# Patient Record
Sex: FEMALE
[De-identification: names, ages, dates, MRNs, and addresses within clinical notes are randomized per-mention and may not be internally consistent; named-entity substitution may affect disease eponyms.]

---

## 2017-02-07 ENCOUNTER — RX RENEWAL (OUTPATIENT)
Age: 57
End: 2017-02-07

## 2017-02-08 ENCOUNTER — APPOINTMENT (OUTPATIENT)
Dept: RADIOLOGY | Facility: HOSPITAL | Age: 57
End: 2017-02-08

## 2017-02-08 ENCOUNTER — OUTPATIENT (OUTPATIENT)
Dept: OUTPATIENT SERVICES | Facility: HOSPITAL | Age: 57
LOS: 1 days | End: 2017-02-08
Payer: MEDICAID

## 2017-02-08 PROCEDURE — 73630 X-RAY EXAM OF FOOT: CPT

## 2017-02-16 ENCOUNTER — RX RENEWAL (OUTPATIENT)
Age: 57
End: 2017-02-16

## 2017-02-27 ENCOUNTER — RX RENEWAL (OUTPATIENT)
Age: 57
End: 2017-02-27

## 2017-02-28 ENCOUNTER — RX RENEWAL (OUTPATIENT)
Age: 57
End: 2017-02-28

## 2017-03-03 ENCOUNTER — APPOINTMENT (OUTPATIENT)
Dept: FAMILY MEDICINE | Facility: CLINIC | Age: 57
End: 2017-03-03

## 2017-03-03 VITALS
WEIGHT: 236 LBS | DIASTOLIC BLOOD PRESSURE: 70 MMHG | HEIGHT: 65 IN | RESPIRATION RATE: 20 BRPM | BODY MASS INDEX: 39.32 KG/M2 | HEART RATE: 68 BPM | SYSTOLIC BLOOD PRESSURE: 122 MMHG

## 2017-03-03 DIAGNOSIS — R22.1 LOCALIZED SWELLING, MASS AND LUMP, NECK: ICD-10-CM

## 2017-03-06 LAB
ALBUMIN SERPL ELPH-MCNC: 4.6 G/DL
ALP BLD-CCNC: 91 U/L
ALT SERPL-CCNC: 19 U/L
ANION GAP SERPL CALC-SCNC: 18 MMOL/L
AST SERPL-CCNC: 22 U/L
BASOPHILS # BLD AUTO: 0.03 K/UL
BASOPHILS NFR BLD AUTO: 0.3 %
BILIRUB SERPL-MCNC: 1 MG/DL
BUN SERPL-MCNC: 18 MG/DL
CALCIUM SERPL-MCNC: 9.7 MG/DL
CHLORIDE SERPL-SCNC: 102 MMOL/L
CHOLEST SERPL-MCNC: 185 MG/DL
CHOLEST/HDLC SERPL: 2.8 RATIO
CO2 SERPL-SCNC: 21 MMOL/L
CREAT SERPL-MCNC: 0.69 MG/DL
EOSINOPHIL # BLD AUTO: 0.22 K/UL
EOSINOPHIL NFR BLD AUTO: 2.2 %
GLUCOSE SERPL-MCNC: 105 MG/DL
HBA1C MFR BLD HPLC: 6.1 %
HCT VFR BLD CALC: 43.3 %
HDLC SERPL-MCNC: 65 MG/DL
HGB BLD-MCNC: 14 G/DL
IMM GRANULOCYTES NFR BLD AUTO: 0.3 %
LDLC SERPL CALC-MCNC: 102 MG/DL
LYMPHOCYTES # BLD AUTO: 1.84 K/UL
LYMPHOCYTES NFR BLD AUTO: 18.6 %
MAN DIFF?: NORMAL
MCHC RBC-ENTMCNC: 28.1 PG
MCHC RBC-ENTMCNC: 32.3 GM/DL
MCV RBC AUTO: 86.9 FL
MONOCYTES # BLD AUTO: 0.38 K/UL
MONOCYTES NFR BLD AUTO: 3.8 %
NEUTROPHILS # BLD AUTO: 7.38 K/UL
NEUTROPHILS NFR BLD AUTO: 74.8 %
PLATELET # BLD AUTO: 259 K/UL
POTASSIUM SERPL-SCNC: 4.7 MMOL/L
PROT SERPL-MCNC: 8 G/DL
RBC # BLD: 4.98 M/UL
RBC # FLD: 14.7 %
SODIUM SERPL-SCNC: 141 MMOL/L
T4 FREE SERPL-MCNC: 1.2 NG/DL
TRIGL SERPL-MCNC: 89 MG/DL
TSH SERPL-ACNC: 0.55 UIU/ML
WBC # FLD AUTO: 9.88 K/UL

## 2017-03-13 ENCOUNTER — RX RENEWAL (OUTPATIENT)
Age: 57
End: 2017-03-13

## 2017-04-17 ENCOUNTER — RX RENEWAL (OUTPATIENT)
Age: 57
End: 2017-04-17

## 2017-05-11 ENCOUNTER — RX RENEWAL (OUTPATIENT)
Age: 57
End: 2017-05-11

## 2017-05-16 ENCOUNTER — CLINICAL ADVICE (OUTPATIENT)
Age: 57
End: 2017-05-16

## 2017-06-02 ENCOUNTER — APPOINTMENT (OUTPATIENT)
Dept: FAMILY MEDICINE | Facility: CLINIC | Age: 57
End: 2017-06-02

## 2017-06-13 ENCOUNTER — RX RENEWAL (OUTPATIENT)
Age: 57
End: 2017-06-13

## 2017-06-29 ENCOUNTER — RX RENEWAL (OUTPATIENT)
Age: 57
End: 2017-06-29

## 2017-07-03 ENCOUNTER — RX RENEWAL (OUTPATIENT)
Age: 57
End: 2017-07-03

## 2017-07-13 ENCOUNTER — RX RENEWAL (OUTPATIENT)
Age: 57
End: 2017-07-13

## 2017-07-30 ENCOUNTER — RX RENEWAL (OUTPATIENT)
Age: 57
End: 2017-07-30

## 2017-08-04 ENCOUNTER — RX RENEWAL (OUTPATIENT)
Age: 57
End: 2017-08-04

## 2017-08-11 ENCOUNTER — RX RENEWAL (OUTPATIENT)
Age: 57
End: 2017-08-11

## 2017-09-11 ENCOUNTER — RX RENEWAL (OUTPATIENT)
Age: 57
End: 2017-09-11

## 2017-09-28 ENCOUNTER — RX RENEWAL (OUTPATIENT)
Age: 57
End: 2017-09-28

## 2017-10-12 ENCOUNTER — RX RENEWAL (OUTPATIENT)
Age: 57
End: 2017-10-12

## 2017-11-10 ENCOUNTER — RX RENEWAL (OUTPATIENT)
Age: 57
End: 2017-11-10

## 2017-11-27 ENCOUNTER — RX RENEWAL (OUTPATIENT)
Age: 57
End: 2017-11-27

## 2017-11-28 ENCOUNTER — APPOINTMENT (OUTPATIENT)
Dept: FAMILY MEDICINE | Facility: CLINIC | Age: 57
End: 2017-11-28
Payer: MEDICAID

## 2017-11-28 VITALS
HEART RATE: 74 BPM | HEIGHT: 65 IN | SYSTOLIC BLOOD PRESSURE: 125 MMHG | DIASTOLIC BLOOD PRESSURE: 70 MMHG | BODY MASS INDEX: 39.32 KG/M2 | WEIGHT: 236 LBS | RESPIRATION RATE: 20 BRPM

## 2017-11-28 PROCEDURE — G0008: CPT

## 2017-11-28 PROCEDURE — 90688 IIV4 VACCINE SPLT 0.5 ML IM: CPT

## 2017-11-28 PROCEDURE — 36415 COLL VENOUS BLD VENIPUNCTURE: CPT

## 2017-11-28 PROCEDURE — 99213 OFFICE O/P EST LOW 20 MIN: CPT | Mod: 25

## 2017-12-09 LAB
ALBUMIN SERPL ELPH-MCNC: 4.4 G/DL
ALP BLD-CCNC: 97 U/L
ALT SERPL-CCNC: 10 U/L
ANION GAP SERPL CALC-SCNC: 18 MMOL/L
AST SERPL-CCNC: 15 U/L
BASOPHILS # BLD AUTO: 0.05 K/UL
BASOPHILS NFR BLD AUTO: 0.5 %
BILIRUB SERPL-MCNC: 1.2 MG/DL
BUN SERPL-MCNC: 16 MG/DL
CALCIUM SERPL-MCNC: 9.9 MG/DL
CHLORIDE SERPL-SCNC: 102 MMOL/L
CHOLEST SERPL-MCNC: 189 MG/DL
CHOLEST/HDLC SERPL: 3.3 RATIO
CO2 SERPL-SCNC: 21 MMOL/L
CREAT SERPL-MCNC: 0.89 MG/DL
EOSINOPHIL # BLD AUTO: 0.17 K/UL
EOSINOPHIL NFR BLD AUTO: 1.8 %
GLUCOSE SERPL-MCNC: 113 MG/DL
HBA1C MFR BLD HPLC: 5.9 %
HCT VFR BLD CALC: 42.1 %
HCV AB SER QL: NONREACTIVE
HCV S/CO RATIO: 0.18 S/CO
HDLC SERPL-MCNC: 57 MG/DL
HGB BLD-MCNC: 13.8 G/DL
IMM GRANULOCYTES NFR BLD AUTO: 0.1 %
LDLC SERPL CALC-MCNC: 106 MG/DL
LYMPHOCYTES # BLD AUTO: 1.14 K/UL
LYMPHOCYTES NFR BLD AUTO: 12.2 %
MAN DIFF?: NORMAL
MCHC RBC-ENTMCNC: 28.1 PG
MCHC RBC-ENTMCNC: 32.8 GM/DL
MCV RBC AUTO: 85.7 FL
MONOCYTES # BLD AUTO: 0.4 K/UL
MONOCYTES NFR BLD AUTO: 4.3 %
NEUTROPHILS # BLD AUTO: 7.58 K/UL
NEUTROPHILS NFR BLD AUTO: 81.1 %
PLATELET # BLD AUTO: 289 K/UL
POTASSIUM SERPL-SCNC: 5 MMOL/L
PROT SERPL-MCNC: 8.2 G/DL
RBC # BLD: 4.91 M/UL
RBC # FLD: 13.9 %
SODIUM SERPL-SCNC: 141 MMOL/L
T4 FREE SERPL-MCNC: 1.3 NG/DL
TRIGL SERPL-MCNC: 129 MG/DL
TSH SERPL-ACNC: 0.65 UIU/ML
WBC # FLD AUTO: 9.35 K/UL

## 2017-12-11 ENCOUNTER — RX RENEWAL (OUTPATIENT)
Age: 57
End: 2017-12-11

## 2017-12-28 ENCOUNTER — RX RENEWAL (OUTPATIENT)
Age: 57
End: 2017-12-28

## 2018-01-09 ENCOUNTER — RX RENEWAL (OUTPATIENT)
Age: 58
End: 2018-01-09

## 2018-01-22 ENCOUNTER — RX RENEWAL (OUTPATIENT)
Age: 58
End: 2018-01-22

## 2018-02-08 ENCOUNTER — RX RENEWAL (OUTPATIENT)
Age: 58
End: 2018-02-08

## 2018-02-11 ENCOUNTER — RX RENEWAL (OUTPATIENT)
Age: 58
End: 2018-02-11

## 2018-03-06 ENCOUNTER — RX RENEWAL (OUTPATIENT)
Age: 58
End: 2018-03-06

## 2018-03-12 ENCOUNTER — RX RENEWAL (OUTPATIENT)
Age: 58
End: 2018-03-12

## 2018-03-22 ENCOUNTER — RX RENEWAL (OUTPATIENT)
Age: 58
End: 2018-03-22

## 2018-04-06 ENCOUNTER — RX RENEWAL (OUTPATIENT)
Age: 58
End: 2018-04-06

## 2018-04-23 ENCOUNTER — APPOINTMENT (OUTPATIENT)
Dept: FAMILY MEDICINE | Facility: CLINIC | Age: 58
End: 2018-04-23

## 2018-04-25 ENCOUNTER — RX RENEWAL (OUTPATIENT)
Age: 58
End: 2018-04-25

## 2018-05-04 ENCOUNTER — RX RENEWAL (OUTPATIENT)
Age: 58
End: 2018-05-04

## 2018-05-12 ENCOUNTER — CLINICAL ADVICE (OUTPATIENT)
Age: 58
End: 2018-05-12

## 2018-06-01 ENCOUNTER — RX RENEWAL (OUTPATIENT)
Age: 58
End: 2018-06-01

## 2018-06-07 ENCOUNTER — RX RENEWAL (OUTPATIENT)
Age: 58
End: 2018-06-07

## 2018-07-02 ENCOUNTER — RX RENEWAL (OUTPATIENT)
Age: 58
End: 2018-07-02

## 2018-08-02 ENCOUNTER — RX RENEWAL (OUTPATIENT)
Age: 58
End: 2018-08-02

## 2018-08-30 ENCOUNTER — RX RENEWAL (OUTPATIENT)
Age: 58
End: 2018-08-30

## 2018-09-17 ENCOUNTER — RX RENEWAL (OUTPATIENT)
Age: 58
End: 2018-09-17

## 2018-09-24 ENCOUNTER — APPOINTMENT (OUTPATIENT)
Dept: FAMILY MEDICINE | Facility: CLINIC | Age: 58
End: 2018-09-24
Payer: COMMERCIAL

## 2018-09-24 VITALS
DIASTOLIC BLOOD PRESSURE: 70 MMHG | WEIGHT: 238 LBS | HEART RATE: 64 BPM | RESPIRATION RATE: 20 BRPM | SYSTOLIC BLOOD PRESSURE: 125 MMHG | BODY MASS INDEX: 39.65 KG/M2 | HEIGHT: 65 IN

## 2018-09-24 DIAGNOSIS — Z00.00 ENCOUNTER FOR GENERAL ADULT MEDICAL EXAMINATION W/OUT ABNORMAL FINDINGS: ICD-10-CM

## 2018-09-24 PROCEDURE — 90686 IIV4 VACC NO PRSV 0.5 ML IM: CPT

## 2018-09-24 PROCEDURE — G0008: CPT

## 2018-09-24 PROCEDURE — 99396 PREV VISIT EST AGE 40-64: CPT | Mod: 25

## 2018-09-24 PROCEDURE — 36415 COLL VENOUS BLD VENIPUNCTURE: CPT

## 2018-09-24 PROCEDURE — 93000 ELECTROCARDIOGRAM COMPLETE: CPT | Mod: 59

## 2018-09-24 NOTE — HEALTH RISK ASSESSMENT
[Discussed at today's visit] : Advance Directives Discussed at today's visit [] : No [FreeTextEntry4] : to check records

## 2018-09-24 NOTE — ASSESSMENT
[FreeTextEntry1] : Hemodynamically stable with acceptable BP; exam findings consistent with history\par All chronic issues well managed with current medication regime\par Lab profiles sent\par Flu vaccine given L deltoid

## 2018-09-24 NOTE — HISTORY OF PRESENT ILLNESS
[FreeTextEntry1] : Requests comprehensive exam [de-identified] : Presents for comprehensive exam.  Reviewed immunizations - requests flu vaccine.  Reviewed screening - has not had GYN exam or mammogram in an extended period of time - discussed the importance - pt is very hesitant due to home issues.  Also has not had colonoscopy but does agree to fecal immunology - kit given at this encounter.  Reviewed medication - using all medication very appropriately to maintain daily activities and responsibilities at home and general QOL - note pt is alert, conversant, with no evidence of intoxication or euphoria.

## 2018-09-24 NOTE — PHYSICAL EXAM
[No Acute Distress] : no acute distress [Well Nourished] : well nourished [Well Developed] : well developed [Well-Appearing] : well-appearing [Normal Sclera/Conjunctiva] : normal sclera/conjunctiva [PERRL] : pupils equal round and reactive to light [EOMI] : extraocular movements intact [Normal Outer Ear/Nose] : the outer ears and nose were normal in appearance [Normal Oropharynx] : the oropharynx was normal [Normal TMs] : both tympanic membranes were normal [Normal Nasal Mucosa] : the nasal mucosa was normal [No JVD] : no jugular venous distention [Supple] : supple [No Lymphadenopathy] : no lymphadenopathy [Thyroid Normal, No Nodules] : the thyroid was normal and there were no nodules present [No Respiratory Distress] : no respiratory distress  [Clear to Auscultation] : lungs were clear to auscultation bilaterally [No Accessory Muscle Use] : no accessory muscle use [Normal Rate] : normal rate  [Regular Rhythm] : with a regular rhythm [Normal S1, S2] : normal S1 and S2 [No Murmur] : no murmur heard [No Carotid Bruits] : no carotid bruits [No Abdominal Bruit] : a ~M bruit was not heard ~T in the abdomen [No Varicosities] : no varicosities [Pedal Pulses Present] : the pedal pulses are present [No Edema] : there was no peripheral edema [No Extremity Clubbing/Cyanosis] : no extremity clubbing/cyanosis [No Palpable Aorta] : no palpable aorta [Soft] : abdomen soft [Non Tender] : non-tender [Non-distended] : non-distended [No Masses] : no abdominal mass palpated [No HSM] : no HSM [Normal Bowel Sounds] : normal bowel sounds [Normal Posterior Cervical Nodes] : no posterior cervical lymphadenopathy [Normal Femoral Nodes] : no femoral lymphadenopathy [Normal Anterior Cervical Nodes] : no anterior cervical lymphadenopathy [Normal Inguinal Nodes] : no inguinal lymphadenopathy [L-Spine ___ (level)] : ~Ulevel [unfilled] lumbar spine [Muscle Spasms, Bilateral] : bilateral muscle spasms [Shuffling] : shuffling [Motor Strength Lower Extremities Bilaterally] : there was weakness in both lower extremities [No Rash] : no rash [No Skin Lesions] : no skin lesions [No Focal Deficits] : no focal deficits [Speech Grossly Normal] : speech grossly normal [Memory Grossly Normal] : memory grossly normal [Normal Affect] : the affect was normal [Alert and Oriented x3] : oriented to person, place, and time [Normal Mood] : the mood was normal [Normal Insight/Judgement] : insight and judgment were intact [de-identified] : using cane for ambulation

## 2018-09-25 LAB
ALBUMIN SERPL ELPH-MCNC: 4.7 G/DL
ALP BLD-CCNC: 108 U/L
ALT SERPL-CCNC: 13 U/L
ANION GAP SERPL CALC-SCNC: 21 MMOL/L
AST SERPL-CCNC: 18 U/L
BASOPHILS # BLD AUTO: 0.03 K/UL
BASOPHILS NFR BLD AUTO: 0.3 %
BILIRUB SERPL-MCNC: 1.1 MG/DL
BUN SERPL-MCNC: 16 MG/DL
CALCIUM SERPL-MCNC: 9.8 MG/DL
CHLORIDE SERPL-SCNC: 102 MMOL/L
CHOLEST SERPL-MCNC: 145 MG/DL
CHOLEST/HDLC SERPL: 2.6 RATIO
CO2 SERPL-SCNC: 20 MMOL/L
CREAT SERPL-MCNC: 1.01 MG/DL
EOSINOPHIL # BLD AUTO: 0.09 K/UL
EOSINOPHIL NFR BLD AUTO: 0.9 %
GLUCOSE SERPL-MCNC: 113 MG/DL
HBA1C MFR BLD HPLC: 6 %
HCT VFR BLD CALC: 44.6 %
HDLC SERPL-MCNC: 55 MG/DL
HGB BLD-MCNC: 13.7 G/DL
IMM GRANULOCYTES NFR BLD AUTO: 0.2 %
LDLC SERPL CALC-MCNC: 65 MG/DL
LYMPHOCYTES # BLD AUTO: 1.57 K/UL
LYMPHOCYTES NFR BLD AUTO: 16.3 %
MAN DIFF?: NORMAL
MCHC RBC-ENTMCNC: 26.1 PG
MCHC RBC-ENTMCNC: 30.7 GM/DL
MCV RBC AUTO: 85 FL
MONOCYTES # BLD AUTO: 0.44 K/UL
MONOCYTES NFR BLD AUTO: 4.6 %
NEUTROPHILS # BLD AUTO: 7.47 K/UL
NEUTROPHILS NFR BLD AUTO: 77.7 %
PLATELET # BLD AUTO: 317 K/UL
POTASSIUM SERPL-SCNC: 4.8 MMOL/L
PROT SERPL-MCNC: 8.1 G/DL
RBC # BLD: 5.25 M/UL
RBC # FLD: 15 %
SODIUM SERPL-SCNC: 143 MMOL/L
T4 FREE SERPL-MCNC: 1.4 NG/DL
TRIGL SERPL-MCNC: 125 MG/DL
TSH SERPL-ACNC: 0.33 UIU/ML
WBC # FLD AUTO: 9.62 K/UL

## 2018-09-27 ENCOUNTER — RX RENEWAL (OUTPATIENT)
Age: 58
End: 2018-09-27

## 2018-10-11 ENCOUNTER — RX RENEWAL (OUTPATIENT)
Age: 58
End: 2018-10-11

## 2018-10-18 ENCOUNTER — RX RENEWAL (OUTPATIENT)
Age: 58
End: 2018-10-18

## 2018-10-26 ENCOUNTER — RX RENEWAL (OUTPATIENT)
Age: 58
End: 2018-10-26

## 2018-11-19 ENCOUNTER — RX RENEWAL (OUTPATIENT)
Age: 58
End: 2018-11-19

## 2018-11-26 ENCOUNTER — RX RENEWAL (OUTPATIENT)
Age: 58
End: 2018-11-26

## 2018-11-28 DIAGNOSIS — B00.9 HERPESVIRAL INFECTION, UNSPECIFIED: ICD-10-CM

## 2018-12-20 ENCOUNTER — RX RENEWAL (OUTPATIENT)
Age: 58
End: 2018-12-20

## 2019-01-07 ENCOUNTER — RX RENEWAL (OUTPATIENT)
Age: 59
End: 2019-01-07

## 2019-01-11 ENCOUNTER — CLINICAL ADVICE (OUTPATIENT)
Age: 59
End: 2019-01-11

## 2019-01-24 ENCOUNTER — RX RENEWAL (OUTPATIENT)
Age: 59
End: 2019-01-24

## 2019-02-22 ENCOUNTER — RX RENEWAL (OUTPATIENT)
Age: 59
End: 2019-02-22

## 2019-03-25 ENCOUNTER — RX RENEWAL (OUTPATIENT)
Age: 59
End: 2019-03-25

## 2019-04-15 ENCOUNTER — RX RENEWAL (OUTPATIENT)
Age: 59
End: 2019-04-15

## 2019-04-23 ENCOUNTER — RX RENEWAL (OUTPATIENT)
Age: 59
End: 2019-04-23

## 2019-04-30 ENCOUNTER — RX RENEWAL (OUTPATIENT)
Age: 59
End: 2019-04-30

## 2019-05-21 ENCOUNTER — RX RENEWAL (OUTPATIENT)
Age: 59
End: 2019-05-21

## 2019-06-08 ENCOUNTER — RX RENEWAL (OUTPATIENT)
Age: 59
End: 2019-06-08

## 2019-06-18 ENCOUNTER — RX RENEWAL (OUTPATIENT)
Age: 59
End: 2019-06-18

## 2019-06-21 ENCOUNTER — RX RENEWAL (OUTPATIENT)
Age: 59
End: 2019-06-21

## 2019-07-05 ENCOUNTER — RX CHANGE (OUTPATIENT)
Age: 59
End: 2019-07-05

## 2019-07-08 RX ORDER — BENZETHONIUM CHLORIDE 0 ML/ML
0.1 SOLUTION TOPICAL
Qty: 8 | Refills: 5 | Status: COMPLETED | OUTPATIENT
Start: 2019-07-08 | End: 2019-07-08

## 2019-07-18 ENCOUNTER — RX RENEWAL (OUTPATIENT)
Age: 59
End: 2019-07-18

## 2019-07-22 ENCOUNTER — RX RENEWAL (OUTPATIENT)
Age: 59
End: 2019-07-22

## 2019-07-29 ENCOUNTER — RX RENEWAL (OUTPATIENT)
Age: 59
End: 2019-07-29

## 2019-08-16 ENCOUNTER — RX RENEWAL (OUTPATIENT)
Age: 59
End: 2019-08-16

## 2019-08-22 ENCOUNTER — RX RENEWAL (OUTPATIENT)
Age: 59
End: 2019-08-22

## 2019-08-29 ENCOUNTER — RX RENEWAL (OUTPATIENT)
Age: 59
End: 2019-08-29

## 2019-09-16 ENCOUNTER — RX RENEWAL (OUTPATIENT)
Age: 59
End: 2019-09-16

## 2019-10-15 ENCOUNTER — RX CHANGE (OUTPATIENT)
Age: 59
End: 2019-10-15

## 2019-11-15 ENCOUNTER — RX RENEWAL (OUTPATIENT)
Age: 59
End: 2019-11-15

## 2019-12-16 ENCOUNTER — RX RENEWAL (OUTPATIENT)
Age: 59
End: 2019-12-16

## 2020-02-13 ENCOUNTER — RX RENEWAL (OUTPATIENT)
Age: 60
End: 2020-02-13

## 2020-03-13 ENCOUNTER — APPOINTMENT (OUTPATIENT)
Dept: FAMILY MEDICINE | Facility: CLINIC | Age: 60
End: 2020-03-13

## 2020-06-04 ENCOUNTER — RX RENEWAL (OUTPATIENT)
Age: 60
End: 2020-06-04

## 2020-08-09 ENCOUNTER — RX RENEWAL (OUTPATIENT)
Age: 60
End: 2020-08-09

## 2020-08-23 ENCOUNTER — RX RENEWAL (OUTPATIENT)
Age: 60
End: 2020-08-23

## 2021-02-05 ENCOUNTER — RX RENEWAL (OUTPATIENT)
Age: 61
End: 2021-02-05

## 2021-04-25 ENCOUNTER — RX RENEWAL (OUTPATIENT)
Age: 61
End: 2021-04-25

## 2021-05-10 ENCOUNTER — RX RENEWAL (OUTPATIENT)
Age: 61
End: 2021-05-10

## 2021-06-24 ENCOUNTER — APPOINTMENT (OUTPATIENT)
Dept: FAMILY MEDICINE | Facility: CLINIC | Age: 61
End: 2021-06-24

## 2021-10-20 ENCOUNTER — RX RENEWAL (OUTPATIENT)
Age: 61
End: 2021-10-20

## 2021-11-01 ENCOUNTER — TRANSCRIPTION ENCOUNTER (OUTPATIENT)
Age: 61
End: 2021-11-01

## 2021-11-02 ENCOUNTER — NON-APPOINTMENT (OUTPATIENT)
Age: 61
End: 2021-11-02

## 2021-11-15 ENCOUNTER — RX RENEWAL (OUTPATIENT)
Age: 61
End: 2021-11-15

## 2022-01-18 ENCOUNTER — APPOINTMENT (OUTPATIENT)
Dept: FAMILY MEDICINE | Facility: CLINIC | Age: 62
End: 2022-01-18

## 2022-02-10 ENCOUNTER — RX RENEWAL (OUTPATIENT)
Age: 62
End: 2022-02-10

## 2022-04-25 ENCOUNTER — RX RENEWAL (OUTPATIENT)
Age: 62
End: 2022-04-25

## 2022-05-26 ENCOUNTER — RX RENEWAL (OUTPATIENT)
Age: 62
End: 2022-05-26

## 2022-08-13 ENCOUNTER — RX RENEWAL (OUTPATIENT)
Age: 62
End: 2022-08-13

## 2022-09-01 ENCOUNTER — APPOINTMENT (OUTPATIENT)
Dept: FAMILY MEDICINE | Facility: CLINIC | Age: 62
End: 2022-09-01

## 2022-10-10 ENCOUNTER — TRANSCRIPTION ENCOUNTER (OUTPATIENT)
Age: 62
End: 2022-10-10

## 2022-10-11 ENCOUNTER — APPOINTMENT (OUTPATIENT)
Dept: FAMILY MEDICINE | Facility: CLINIC | Age: 62
End: 2022-10-11

## 2022-10-15 ENCOUNTER — TRANSCRIPTION ENCOUNTER (OUTPATIENT)
Age: 62
End: 2022-10-15

## 2022-10-15 ENCOUNTER — EMERGENCY (EMERGENCY)
Facility: HOSPITAL | Age: 62
LOS: 1 days | Discharge: HOME | End: 2022-10-15
Attending: EMERGENCY MEDICINE
Payer: MEDICAID

## 2022-10-15 VITALS
OXYGEN SATURATION: 100 % | HEART RATE: 76 BPM | TEMPERATURE: 99 F | HEIGHT: 61 IN | WEIGHT: 149.91 LBS | SYSTOLIC BLOOD PRESSURE: 124 MMHG | DIASTOLIC BLOOD PRESSURE: 82 MMHG | RESPIRATION RATE: 20 BRPM

## 2022-10-15 LAB
ALBUMIN SERPL ELPH-MCNC: 4.5 G/DL — SIGNIFICANT CHANGE UP (ref 3.3–5)
ALP SERPL-CCNC: 84 U/L — SIGNIFICANT CHANGE UP (ref 40–120)
ALT FLD-CCNC: 9 U/L — LOW (ref 10–45)
AMPHET UR-MCNC: NEGATIVE — SIGNIFICANT CHANGE UP
ANION GAP SERPL CALC-SCNC: 18 MMOL/L — HIGH (ref 5–17)
APAP SERPL-MCNC: <15 UG/ML — SIGNIFICANT CHANGE UP (ref 10–30)
AST SERPL-CCNC: 11 U/L — SIGNIFICANT CHANGE UP (ref 10–40)
BARBITURATES UR SCN-MCNC: NEGATIVE — SIGNIFICANT CHANGE UP
BASOPHILS # BLD AUTO: 0.06 K/UL — SIGNIFICANT CHANGE UP (ref 0–0.2)
BASOPHILS NFR BLD AUTO: 0.7 % — SIGNIFICANT CHANGE UP (ref 0–2)
BENZODIAZ UR-MCNC: POSITIVE
BILIRUB SERPL-MCNC: 0.6 MG/DL — SIGNIFICANT CHANGE UP (ref 0.2–1.2)
BUN SERPL-MCNC: 27 MG/DL — HIGH (ref 7–23)
CALCIUM SERPL-MCNC: 9.6 MG/DL — SIGNIFICANT CHANGE UP (ref 8.4–10.5)
CHLORIDE SERPL-SCNC: 107 MMOL/L — SIGNIFICANT CHANGE UP (ref 96–108)
CO2 SERPL-SCNC: 16 MMOL/L — LOW (ref 22–31)
COCAINE METAB.OTHER UR-MCNC: NEGATIVE — SIGNIFICANT CHANGE UP
CREAT SERPL-MCNC: 0.93 MG/DL — SIGNIFICANT CHANGE UP (ref 0.5–1.3)
EGFR: 71 ML/MIN/1.73M2 — SIGNIFICANT CHANGE UP
EOSINOPHIL # BLD AUTO: 0.17 K/UL — SIGNIFICANT CHANGE UP (ref 0–0.5)
EOSINOPHIL NFR BLD AUTO: 2 % — SIGNIFICANT CHANGE UP (ref 0–6)
ETHANOL SERPL-MCNC: <10 MG/DL — SIGNIFICANT CHANGE UP (ref 0–10)
GLUCOSE SERPL-MCNC: 129 MG/DL — HIGH (ref 70–99)
HCT VFR BLD CALC: 38.8 % — SIGNIFICANT CHANGE UP (ref 34.5–45)
HGB BLD-MCNC: 12.2 G/DL — SIGNIFICANT CHANGE UP (ref 11.5–15.5)
IMM GRANULOCYTES NFR BLD AUTO: 0.2 % — SIGNIFICANT CHANGE UP (ref 0–0.9)
LYMPHOCYTES # BLD AUTO: 1.48 K/UL — SIGNIFICANT CHANGE UP (ref 1–3.3)
LYMPHOCYTES # BLD AUTO: 17.1 % — SIGNIFICANT CHANGE UP (ref 13–44)
MCHC RBC-ENTMCNC: 25.6 PG — LOW (ref 27–34)
MCHC RBC-ENTMCNC: 31.4 GM/DL — LOW (ref 32–36)
MCV RBC AUTO: 81.5 FL — SIGNIFICANT CHANGE UP (ref 80–100)
METHADONE UR-MCNC: NEGATIVE — SIGNIFICANT CHANGE UP
MONOCYTES # BLD AUTO: 0.59 K/UL — SIGNIFICANT CHANGE UP (ref 0–0.9)
MONOCYTES NFR BLD AUTO: 6.8 % — SIGNIFICANT CHANGE UP (ref 2–14)
NEUTROPHILS # BLD AUTO: 6.32 K/UL — SIGNIFICANT CHANGE UP (ref 1.8–7.4)
NEUTROPHILS NFR BLD AUTO: 73.2 % — SIGNIFICANT CHANGE UP (ref 43–77)
NRBC # BLD: 0 /100 WBCS — SIGNIFICANT CHANGE UP (ref 0–0)
OPIATES UR-MCNC: NEGATIVE — SIGNIFICANT CHANGE UP
OXYCODONE UR-MCNC: NEGATIVE — SIGNIFICANT CHANGE UP
PCP SPEC-MCNC: SIGNIFICANT CHANGE UP
PCP UR-MCNC: NEGATIVE — SIGNIFICANT CHANGE UP
PLATELET # BLD AUTO: 320 K/UL — SIGNIFICANT CHANGE UP (ref 150–400)
POTASSIUM SERPL-MCNC: 4.2 MMOL/L — SIGNIFICANT CHANGE UP (ref 3.5–5.3)
POTASSIUM SERPL-SCNC: 4.2 MMOL/L — SIGNIFICANT CHANGE UP (ref 3.5–5.3)
PROT SERPL-MCNC: 7.6 G/DL — SIGNIFICANT CHANGE UP (ref 6–8.3)
RBC # BLD: 4.76 M/UL — SIGNIFICANT CHANGE UP (ref 3.8–5.2)
RBC # FLD: 15.6 % — HIGH (ref 10.3–14.5)
SALICYLATES SERPL-MCNC: 3.2 MG/DL — LOW (ref 15–30)
SARS-COV-2 RNA SPEC QL NAA+PROBE: SIGNIFICANT CHANGE UP
SODIUM SERPL-SCNC: 141 MMOL/L — SIGNIFICANT CHANGE UP (ref 135–145)
THC UR QL: NEGATIVE — SIGNIFICANT CHANGE UP
WBC # BLD: 8.64 K/UL — SIGNIFICANT CHANGE UP (ref 3.8–10.5)
WBC # FLD AUTO: 8.64 K/UL — SIGNIFICANT CHANGE UP (ref 3.8–10.5)

## 2022-10-15 PROCEDURE — 99285 EMERGENCY DEPT VISIT HI MDM: CPT

## 2022-10-15 PROCEDURE — 90792 PSYCH DIAG EVAL W/MED SRVCS: CPT | Mod: 95

## 2022-10-15 PROCEDURE — 93010 ELECTROCARDIOGRAM REPORT: CPT

## 2022-10-15 RX ADMIN — Medication 0.5 MILLIGRAM(S): at 17:57

## 2022-10-15 NOTE — ED BEHAVIORAL HEALTH ASSESSMENT NOTE - CURRENT MEDICATION
Valcyclovir  500mg BID, HCTZ 12.5mg daily, Lasix 20mg daily, simvastatin 40mg QHS, Lisinopril 5mg Qdaily, Plavix 75mg daily, amlodipine 10mg daily, metoprolol 100mg BID, dilaudid 20mg daily, Valium 5mg BID  (ISTOP verified)
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
## 2022-10-15 NOTE — ED BEHAVIORAL HEALTH ASSESSMENT NOTE - RISK ASSESSMENT
Low Acute Suicide Risk Protective factors - Tenriism orientation   Risk factors - lack of compliance with outpatient care

## 2022-10-15 NOTE — ED PROVIDER NOTE - CLINICAL SUMMARY MEDICAL DECISION MAKING FREE TEXT BOX
62 yo F, unknown psych history bibems for acute psychosis. Patient intermittently cooperative aaox2-3, tangential and pressured speech, religiously preoccupied. VSS. Low suspicion for metabolic or infectious etiology. Plan for labs, urine, psych eval and dispo pending workup.

## 2022-10-15 NOTE — ED BEHAVIORAL HEALTH ASSESSMENT NOTE - OTHER
unable to assess due to patient's disorganization defer to ED exam anxious hyperverbal [address] pending bed availability

## 2022-10-15 NOTE — ED PROVIDER NOTE - ATTENDING CONTRIBUTION TO CARE
Patient is a 61-year-old with unknown past medical and brought in by EMS secondary to hearing voices at home. Patient is alert to place, time, and self.  Patient has limited report of the events of brought her here, she says she has had mini strokes, cardiac arrest and heart issues. The patient is tangental, states she hears and talks to an paddy.   anxious  tachypneic   reaching and grabbing at staff  talking with eyes closed   no gross deformity of extremities, no asymmetry  will get iv, cbc, cmp, ekg, asa, Tylenol level, and reassess  Will follow up on labs, analgesia, imaging, reassess and disposition as clinically indicated.  *The above represents an initial assessment/impression. Please refer to my progress notes below for potential changes in patient clinical course* Patient is a 61-year-old with unknown past medical and brought in by EMS secondary to hearing voices at home. Patient is alert to place, time, and self.  Patient has limited report of the events of brought her here, she says she has had mini strokes, cardiac arrest and heart issues. The patient is tangental, states she hears and talks to an paddy. Patient admits to chest pressure when asked directly if there is any chest pain or shortness of breath.   anxious  tachypneic   reaching and grabbing at staff  talking with eyes closed   no gross deformity of extremities, no asymmetry  will get iv, cbc, cmp, ekg, asa, Tylenol level, and reassess  Will follow up on labs, analgesia, imaging, reassess and disposition as clinically indicated.  *The above represents an initial assessment/impression. Please refer to my progress notes below for potential changes in patient clinical course*

## 2022-10-15 NOTE — ED PROVIDER NOTE - PHYSICAL EXAMINATION
CONSTITUTIONAL: NAD  SKIN: Warm dry, normal skin turgor  HEAD: NCAT  EYES: EOMI, PERRLA, no scleral icterus, conjunctiva pink  NECK: Supple; non tender. Full ROM.  CARD: RRR  RESP: No respiratory distress  ABD: non-distended  MSK: Full ROM, no leg swelling  PSYCH: Intermittently cooperative, responding to internal stimuli

## 2022-10-15 NOTE — ED BEHAVIORAL HEALTH NOTE - BEHAVIORAL HEALTH NOTE
Consult requested by EM Attending Dr. Gentile at 18:24. Telepsychiatry attempted to consult at 21:20 but unable to initiate due to delay in patient being set up on the telepsychiatry cart. ED staff aware.

## 2022-10-15 NOTE — ED BEHAVIORAL HEALTH ASSESSMENT NOTE - MEDICAL ISSUES AND PLAN (INCLUDE STANDING AND PRN MEDICATION)
Cont. simvastatin 40mg QHS, Lisinopril 5mg Qdaily, Plavix 75mg daily; unclear if patient is taking amlodipine 10mg daily, metoprolol 100mg BID concurrently. Unclear if patient is also taking lasix and HCTZ concurrently.UDS negative for opioids and will hold dilaudid 20mg.

## 2022-10-15 NOTE — ED ADULT NURSE REASSESSMENT NOTE - NS ED NURSE REASSESS COMMENT FT1
Pt is less internally preoccupied, responding well to staff, still tearful at times but better related and better organised overall, given ativan 0.5mg Po with good effect, less anxious, CO in place, VSS, awaiting consult.

## 2022-10-15 NOTE — ED ADULT NURSE NOTE - SUICIDE RISK FACTORS
Agitation/Severe Anxiety/Panic/Unable to engage in safety planning/Impulsivity/Psychotic disorder current/past

## 2022-10-15 NOTE — ED PROVIDER NOTE - SHIFT CHANGE DETAILS
Bill Gentile MD F[facility] note of transfer at the usual time of sign out: Receiving team will follow up on labs, analgesia, any clinical imaging, reassess and disposition as clinically indicated.  Details of patient and plan conveyed to receiving physician and conveyed back for understanding.  There were no questions at this time about the patient's status, disposition, and plan. Patient's care to be taken over by receiving physician at this time, all decisions regarding the progression of care will be made at their discretion.

## 2022-10-15 NOTE — ED BEHAVIORAL HEALTH NOTE - BEHAVIORAL HEALTH NOTE
===================  PRE-HOSPITAL COURSE  ===================  SOURCE: Chart review  DETAILS: EMS activated by pt. due to AH    ============  ED COURSE  ============  SOURCE: Chart review   ARRIVAL: EMS  BELONGINGS: Nothing of note  BEHAVIOR: Pt was compliant with good cooperation however she also grabs at staff and says " you are an paddy". Pending urine specimen, routine blood work done willingly. Hygiene is poor, denies SI/HI. Pt’s affect is elevated and anxious, speech is fast, difficult to interrupt, thoughts are tangential and illogical, pt. is religiously preoccupied, eye contact is poor, pt. will close her eyes will speaking , endorses AH says she speaks to Clarence and God, no aggression or behavioral issues and is AOX4.  TREATMENT: Ativan 0.5mg tab  VISITORS: No friends or family at bedside
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
## 2022-10-15 NOTE — ED BEHAVIORAL HEALTH ASSESSMENT NOTE - DESCRIPTION
Per BTCM note.   Unable to assess for covid questionnaire due to psychosis. Per HPI Denies substance use, lives with mother, daughter recently  per patient report

## 2022-10-15 NOTE — ED PROVIDER NOTE - PROGRESS NOTE DETAILS
Pat, PGY3: psych consulted, pending eval. Pat, PGY3: patient requesting oral meds to calm her down. oral ativan ordered. LILIAN: medically cleared.  no CP/SOB and no urinary sx.  unclear why first trop was sent.  will be 2-pc admission. endorsed to me. pt remains disorganized, signed 2pc. -Delfino Montoya MD-
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
## 2022-10-16 ENCOUNTER — TRANSCRIPTION ENCOUNTER (OUTPATIENT)
Age: 62
End: 2022-10-16

## 2022-10-16 DIAGNOSIS — F29 UNSPECIFIED PSYCHOSIS NOT DUE TO A SUBSTANCE OR KNOWN PHYSIOLOGICAL CONDITION: ICD-10-CM

## 2022-10-16 LAB
APPEARANCE UR: ABNORMAL
BACTERIA # UR AUTO: NEGATIVE — SIGNIFICANT CHANGE UP
BILIRUB UR-MCNC: NEGATIVE — SIGNIFICANT CHANGE UP
COLOR SPEC: SIGNIFICANT CHANGE UP
DIFF PNL FLD: NEGATIVE — SIGNIFICANT CHANGE UP
EPI CELLS # UR: 4 /HPF — SIGNIFICANT CHANGE UP
GLUCOSE UR QL: NEGATIVE — SIGNIFICANT CHANGE UP
HYALINE CASTS # UR AUTO: 2 /LPF — SIGNIFICANT CHANGE UP (ref 0–2)
KETONES UR-MCNC: NEGATIVE — SIGNIFICANT CHANGE UP
LEUKOCYTE ESTERASE UR-ACNC: ABNORMAL
NITRITE UR-MCNC: NEGATIVE — SIGNIFICANT CHANGE UP
PH UR: 5 — SIGNIFICANT CHANGE UP (ref 5–8)
PROT UR-MCNC: NEGATIVE — SIGNIFICANT CHANGE UP
RBC CASTS # UR COMP ASSIST: 27 /HPF — HIGH (ref 0–4)
SP GR SPEC: 1.01 — LOW (ref 1.01–1.02)
UROBILINOGEN FLD QL: NEGATIVE — SIGNIFICANT CHANGE UP
WBC UR QL: 8 /HPF — HIGH (ref 0–5)

## 2022-10-16 PROCEDURE — 70450 CT HEAD/BRAIN W/O DYE: CPT | Mod: 26,QQ

## 2022-10-16 RX ORDER — RISPERIDONE 4 MG/1
1 TABLET ORAL DAILY
Refills: 0 | Status: DISCONTINUED | OUTPATIENT
Start: 2022-10-16 | End: 2022-10-19

## 2022-10-16 RX ORDER — DIAZEPAM 5 MG
5 TABLET ORAL ONCE
Refills: 0 | Status: DISCONTINUED | OUTPATIENT
Start: 2022-10-16 | End: 2022-10-16

## 2022-10-16 RX ORDER — CLOPIDOGREL BISULFATE 75 MG/1
75 TABLET, FILM COATED ORAL AT BEDTIME
Refills: 0 | Status: DISCONTINUED | OUTPATIENT
Start: 2022-10-16 | End: 2022-10-19

## 2022-10-16 RX ORDER — SIMVASTATIN 20 MG/1
40 TABLET, FILM COATED ORAL AT BEDTIME
Refills: 0 | Status: DISCONTINUED | OUTPATIENT
Start: 2022-10-16 | End: 2022-10-19

## 2022-10-16 RX ORDER — METOPROLOL TARTRATE 50 MG
100 TABLET ORAL
Refills: 0 | Status: DISCONTINUED | OUTPATIENT
Start: 2022-10-16 | End: 2022-10-19

## 2022-10-16 RX ORDER — ACETAMINOPHEN 500 MG
975 TABLET ORAL ONCE
Refills: 0 | Status: COMPLETED | OUTPATIENT
Start: 2022-10-16 | End: 2022-10-16

## 2022-10-16 RX ORDER — LISINOPRIL 2.5 MG/1
5 TABLET ORAL DAILY
Refills: 0 | Status: DISCONTINUED | OUTPATIENT
Start: 2022-10-16 | End: 2022-10-19

## 2022-10-16 RX ORDER — DIAZEPAM 5 MG
5 TABLET ORAL
Refills: 0 | Status: COMPLETED | OUTPATIENT
Start: 2022-10-16 | End: 2022-10-23

## 2022-10-16 RX ADMIN — LISINOPRIL 5 MILLIGRAM(S): 2.5 TABLET ORAL at 17:01

## 2022-10-16 RX ADMIN — CLOPIDOGREL BISULFATE 75 MILLIGRAM(S): 75 TABLET, FILM COATED ORAL at 22:21

## 2022-10-16 RX ADMIN — Medication 100 MILLIGRAM(S): at 17:09

## 2022-10-16 RX ADMIN — Medication 5 MILLIGRAM(S): at 11:05

## 2022-10-16 RX ADMIN — Medication 0.5 MILLIGRAM(S): at 06:05

## 2022-10-16 RX ADMIN — SIMVASTATIN 40 MILLIGRAM(S): 20 TABLET, FILM COATED ORAL at 22:21

## 2022-10-16 RX ADMIN — Medication 5 MILLIGRAM(S): at 17:03

## 2022-10-16 RX ADMIN — Medication 975 MILLIGRAM(S): at 03:14

## 2022-10-16 NOTE — ED BEHAVIORAL HEALTH NOTE - BEHAVIORAL HEALTH NOTE
CM attempted to contact patient's mother Saundra [phone] however the phone line continued to ring without an answer.
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
## 2022-10-17 ENCOUNTER — APPOINTMENT (OUTPATIENT)
Dept: FAMILY MEDICINE | Facility: CLINIC | Age: 62
End: 2022-10-17

## 2022-10-17 VITALS
SYSTOLIC BLOOD PRESSURE: 151 MMHG | RESPIRATION RATE: 16 BRPM | OXYGEN SATURATION: 98 % | HEART RATE: 67 BPM | TEMPERATURE: 98 F | DIASTOLIC BLOOD PRESSURE: 84 MMHG

## 2022-10-17 LAB — SARS-COV-2 RNA SPEC QL NAA+PROBE: SIGNIFICANT CHANGE UP

## 2022-10-17 PROCEDURE — 70450 CT HEAD/BRAIN W/O DYE: CPT | Mod: QQ

## 2022-10-17 PROCEDURE — 80307 DRUG TEST PRSMV CHEM ANLYZR: CPT

## 2022-10-17 PROCEDURE — U0003: CPT

## 2022-10-17 PROCEDURE — 84484 ASSAY OF TROPONIN QUANT: CPT

## 2022-10-17 PROCEDURE — 99285 EMERGENCY DEPT VISIT HI MDM: CPT | Mod: 25

## 2022-10-17 PROCEDURE — 85025 COMPLETE CBC W/AUTO DIFF WBC: CPT

## 2022-10-17 PROCEDURE — 93005 ELECTROCARDIOGRAM TRACING: CPT

## 2022-10-17 PROCEDURE — 80053 COMPREHEN METABOLIC PANEL: CPT

## 2022-10-17 PROCEDURE — 36415 COLL VENOUS BLD VENIPUNCTURE: CPT

## 2022-10-17 PROCEDURE — 81001 URINALYSIS AUTO W/SCOPE: CPT

## 2022-10-17 RX ORDER — DIAZEPAM 5 MG
5 TABLET ORAL ONCE
Refills: 0 | Status: DISCONTINUED | OUTPATIENT
Start: 2022-10-17 | End: 2022-10-17

## 2022-10-17 RX ORDER — ACETAMINOPHEN 500 MG
975 TABLET ORAL ONCE
Refills: 0 | Status: COMPLETED | OUTPATIENT
Start: 2022-10-17 | End: 2022-10-17

## 2022-10-17 RX ADMIN — Medication 975 MILLIGRAM(S): at 03:31

## 2022-10-17 RX ADMIN — Medication 5 MILLIGRAM(S): at 08:52

## 2022-10-17 RX ADMIN — Medication 5 MILLIGRAM(S): at 03:31

## 2022-10-17 NOTE — ED ADULT NURSE REASSESSMENT NOTE - DESCRIPTION
Pt continues preoccupation with DC and lacks insight regarding need of psychiatric admission.
Pt spoke to RN. Pt is very disorganized, religiously reoccupied hyperverbal and tangential. Pt was asking to be discharged and said she is safer at home and can function well, then mentioned she asked her mother for holy water.
pt continues to c/o anxiety, Pt took valium 5 mg at 1104H. Pt was encouraged to rest and was offered reading materials.
pt still lacks insight wants to talk to psychiatry and leave the hospital as a DC nack to her hime with her mom  where she said she feels "safer". Pt VS obtained, results WNL;
pt continues to wait for a bed, pt took meds metoprolol 100, valium 5 mg and lisinopril  5 mg po standing medication at 1703H
Pt constinues to be hyperverbal,  focused on asking  different staff members for the possibility  of getting discharged
Pt is being admitted as a 2PC as per psych

## 2022-10-17 NOTE — ED BEHAVIORAL HEALTH NOTE - BEHAVIORAL HEALTH NOTE
=============  Re-assessment Note  =============  SOURCE:  RN and [facility] ED documentation.  BEHAVIOR: RN described patient to currently be resting comfortably, prior to falling asleep patient was restless and anxious, continues to present with perseverative thought process focused on discharge though is verbally re-directable, is not actively expressing SI/HI/AVH, remains in good behavioral control. RN states that the patient often requests to be discharged, states that her mother is her healthcare proxy.   TREATMENT:  Per chart and RN, patient took her routine PM medication Valium 5MG PO.   VISITORS: None

## 2022-10-17 NOTE — ED ADULT NURSE REASSESSMENT NOTE - COMFORT CARE
ambulated to bathroom/meal provided/warm blanket provided
po fluids offered/warm blanket provided
meal provided/po fluids offered/treatment delay explained
plan of care explained
meal provided/plan of care explained/warm blanket provided

## 2022-10-17 NOTE — ED ADULT NURSE REASSESSMENT NOTE - NS ED NURSE REASSESS COMMENT FT1
Patient endorses to RN "I try to ration my pills but sometimes I take more than just one. I took 4 hydromorphone but saved 2. I need more than 5mg of valium; I take more than that at home". Patient given 5mg Valium and 975mg tylenol by RN.

## 2022-10-17 NOTE — ED BEHAVIORAL HEALTH NOTE - BEHAVIORAL HEALTH NOTE
Pt refused po medication risperidal  1 mg at 1200. pt was tangential explaining that she will only take meds from his primary physician

## 2022-10-17 NOTE — ED ADULT NURSE REASSESSMENT NOTE - STATUS
2 PC admission/awaiting bed, no change
awaiting bed, no change
psychiatry is currently assessing pt/awaiting consult
awaiting bed, no change
2 PC admission/awaiting bed, no change
2 PC admission/awaiting bed, no change
2 PC admit/awaiting bed, no change

## 2022-10-17 NOTE — ED ADULT NURSE REASSESSMENT NOTE - GENERAL PATIENT STATE
comfortable appearance
anxious
anxious
comfortable appearance
comfortable appearance
hyperverbal and disorganized/comfortable appearance
cooperative

## 2022-10-25 ENCOUNTER — EMERGENCY (EMERGENCY)
Facility: HOSPITAL | Age: 62
LOS: 1 days | Discharge: HOME | End: 2022-10-25
Attending: EMERGENCY MEDICINE
Payer: MEDICAID

## 2022-10-25 VITALS
RESPIRATION RATE: 18 BRPM | HEIGHT: 61 IN | TEMPERATURE: 99 F | WEIGHT: 164.91 LBS | DIASTOLIC BLOOD PRESSURE: 76 MMHG | SYSTOLIC BLOOD PRESSURE: 123 MMHG | HEART RATE: 85 BPM | OXYGEN SATURATION: 94 %

## 2022-10-25 PROCEDURE — 71046 X-RAY EXAM CHEST 2 VIEWS: CPT

## 2022-10-25 PROCEDURE — 99284 EMERGENCY DEPT VISIT MOD MDM: CPT

## 2022-10-25 PROCEDURE — 71046 X-RAY EXAM CHEST 2 VIEWS: CPT | Mod: 26

## 2022-10-25 NOTE — ED ADULT NURSE NOTE - OBJECTIVE STATEMENT
PT coming to ED after finding out she had COVID after being discharged from [facility]. NO psychiatric emergency at this tie. C/O cold like symptoms. Denies SOB or chest pain. On Pulse oximetry with SPO2 in the high 90s.

## 2022-10-25 NOTE — ED PROVIDER NOTE - NS ED ATTENDING STATEMENT MOD
This was a shared visit with the JANES. I reviewed and verified the documentation and independently performed the documented:

## 2022-10-25 NOTE — ED ADULT TRIAGE NOTE - CHIEF COMPLAINT QUOTE
patient transfer from [facility]. was cleared psychiatrically and was sent here because she had chills and cough from covid.

## 2022-10-25 NOTE — ED PROVIDER NOTE - OBJECTIVE STATEMENT
Pt is a 61y F presenting from [facility] via ambulance for cough. pt is covid +. She is not vaccinated. Pt states she has had symptoms x 10 days. She was cleared psychiatrically and was going to be discharged home today but [facility] wanted her to be evaluated medically for O2 saturation of 93%. Pt is ambulatory. She endorses cough/and diarrhea. She has O2 sat of 96% in ED. Pt was walked around ED and O2 sat [id number]%. Spoke with Dr. Bae who knows about pt and states she was supposed to be discharged home but wanted a medical eval. No other complaints. Pt denies chest pain/ abd pain/nausea/vomiting.

## 2022-10-25 NOTE — ED ADULT NURSE NOTE - CHIEF COMPLAINT QUOTE
patient transfer from [facility]. was cleared psychiatrically and was sent here because she had chills and cough from covid.

## 2022-10-25 NOTE — ED PROVIDER NOTE - NSFOLLOWUPINSTRUCTIONS_ED_ALL_ED_FT
Follow up with PMD as scheduled.  Come back with new or worsening symptoms.  READ ALL ATTACHED INSTRUCTIONS FOR CORONAVIRUS IMMEDIATELY   -SELF QUARANTINE until COVID result is available.   -Avoid contact with others.   -Wash your hands frequently. Disinfect surfaces frequently    SEEK IMMEDIATE MEDICAL CARE IF YOU HAVE ANY OF THE FOLLOWING SYMPTOMS  **If you develop worsening or new symptoms such as shortness of breath, difficulty breathing, chest pain, confusion, severe weakness, or anything concerning to you, please seek immediate medical care or return to the ER .**

## 2022-10-25 NOTE — ED PROVIDER NOTE - CLINICAL SUMMARY MEDICAL DECISION MAKING FREE TEXT BOX
Pt is a 61y F presenting from [facility] with covid. Will check xray and ambulating pulse ox.

## 2022-10-25 NOTE — ED PROVIDER NOTE - ATTENDING APP SHARED VISIT CONTRIBUTION OF CARE
Dr. Sanchez : I have personally seen and examined this patient at the bedside. I have fully participated in the care of this patient. I have reviewed all pertinent clinical information, including history, physical exam, plan and the PA's note and agree except as noted.     61y F present from [facility] via ambulance for cough. pt is covid +. She is not vaccinated. Pt states she has had symptoms x 10 days. She was cleared psychiatrically and was going to be discharged home today but [facility] wanted her to be evaluated medically for O2 saturation of 93% there. Pt is ambulatory. She endorses cough/and diarrhea.       Denies f/c/n/v/cp/sob/palpitations/cough/abd.pain/d/c/dysuria/hematuria. sick contacts/recent travel.    PE:  head; atraumatic normocephalic  eyes: perrla  Heart: rrr s1s2  lungs: ctab  abd: soft, nt nd + bs no rebound/guarding no cva ttp  le: no swelling no calf ttp  back: no midline cervical/thoracic/lumbar ttp      --> Dr. Sanchez : I have personally seen and examined this patient at the bedside. I have fully participated in the care of this patient. I have reviewed all pertinent clinical information, including history, physical exam, plan and the PA's note and agree except as noted.     61y F present from [facility] via ambulance for cough. pt is covid +. She is not vaccinated. Pt states she has had symptoms x 10 days. She was cleared psychiatrically and was going to be discharged home today but [facility] wanted her to be evaluated medically for O2 saturation of 93% there. Pt is ambulatory. + cough since been at [facility] x 10 days and diarrhea. denie cp/sob. +chills      Denies f//n/v/cp/sob/palpitations/abd.pain//c/dysuria/hematuria. no sick contacts/recent travel.    PE:  head; atraumatic normocephalic  eyes: perrla  Heart: rrr s1s2  lungs: ctab  abd: soft, nt nd + bs no rebound/guarding no cva ttp  le: no swelling no calf ttp  back: no midline cervical/thoracic/lumbar ttp      -->lungs ctab sating 95-96% in the ED with ambulation--cleared for dc; psychiatrically cleared by [facility] today ok to dc betina

## 2022-10-25 NOTE — CHART NOTE - NSCHARTNOTEFT_GEN_A_CORE
SWNote: pt ready for discharge + covid , unable to go by lette pt's mother or pt no credit card or check to provide to [facility] ,per PA (Kesha) and Dr Mcdaniel pt very weak having trouble ambulating darlene to be requested. pt's mother requested pt to be sent with +covid kit (given to pt ,understood to remain isolated and away from other house residents ,understoo. Worker called [facility] ([facility]) darlene arranged , per [facility] running behind most likely  p/u will be after midnight . NEAF/billing letter left with EULA Rebolledo made aware of ETA info , understood. No other SW issues reported at this moment.

## 2022-10-25 NOTE — ED PROVIDER NOTE - PRINCIPAL DIAGNOSIS
2019 novel coronavirus disease (COVID-19)
Obtain Level of Care/Service Not Available at this Facility

## 2022-10-25 NOTE — ED PROVIDER NOTE - PATIENT PORTAL LINK FT
You can access the FollowMyHealth Patient Portal offered by [facility] by registering at the following website: [email or web address]. By joining [facility]’s FollowMyHealth portal, you will also be able to view your health information using other applications (apps) compatible with our system.

## 2022-10-26 VITALS
RESPIRATION RATE: 20 BRPM | DIASTOLIC BLOOD PRESSURE: 58 MMHG | OXYGEN SATURATION: 97 % | HEART RATE: 84 BPM | TEMPERATURE: 99 F | SYSTOLIC BLOOD PRESSURE: 93 MMHG

## 2022-10-31 ENCOUNTER — APPOINTMENT (OUTPATIENT)
Dept: FAMILY MEDICINE | Facility: CLINIC | Age: 62
End: 2022-10-31

## 2022-10-31 ENCOUNTER — TRANSCRIPTION ENCOUNTER (OUTPATIENT)
Age: 62
End: 2022-10-31

## 2022-10-31 VITALS — SYSTOLIC BLOOD PRESSURE: 125 MMHG | RESPIRATION RATE: 20 BRPM | DIASTOLIC BLOOD PRESSURE: 70 MMHG | HEART RATE: 68 BPM

## 2022-10-31 DIAGNOSIS — Z23 ENCOUNTER FOR IMMUNIZATION: ICD-10-CM

## 2022-10-31 PROCEDURE — 99214 OFFICE O/P EST MOD 30 MIN: CPT | Mod: 25

## 2022-10-31 PROCEDURE — 90686 IIV4 VACC NO PRSV 0.5 ML IM: CPT

## 2022-10-31 PROCEDURE — G0008: CPT

## 2022-10-31 PROCEDURE — 36415 COLL VENOUS BLD VENIPUNCTURE: CPT

## 2022-11-01 ENCOUNTER — TRANSCRIPTION ENCOUNTER (OUTPATIENT)
Age: 62
End: 2022-11-01

## 2022-11-01 DIAGNOSIS — R11.0 NAUSEA: ICD-10-CM

## 2022-11-01 LAB
ALBUMIN SERPL ELPH-MCNC: 4.2 G/DL
ALP BLD-CCNC: 75 U/L
ALT SERPL-CCNC: 17 U/L
ANION GAP SERPL CALC-SCNC: 16 MMOL/L
AST SERPL-CCNC: 14 U/L
BASOPHILS # BLD AUTO: 0.04 K/UL
BASOPHILS NFR BLD AUTO: 0.4 %
BILIRUB SERPL-MCNC: <0.2 MG/DL
BUN SERPL-MCNC: 37 MG/DL
CALCIUM SERPL-MCNC: 8.4 MG/DL
CHLORIDE SERPL-SCNC: 106 MMOL/L
CHOLEST SERPL-MCNC: 171 MG/DL
CO2 SERPL-SCNC: 21 MMOL/L
CREAT SERPL-MCNC: 2.24 MG/DL
EGFR: 25 ML/MIN/1.73M2
EOSINOPHIL # BLD AUTO: 0.28 K/UL
EOSINOPHIL NFR BLD AUTO: 2.9 %
ESTIMATED AVERAGE GLUCOSE: 131 MG/DL
FOLATE SERPL-MCNC: 9.8 NG/ML
GLUCOSE SERPL-MCNC: 110 MG/DL
HBA1C MFR BLD HPLC: 6.2 %
HCT VFR BLD CALC: 40.1 %
HDLC SERPL-MCNC: 30 MG/DL
HGB BLD-MCNC: 12.2 G/DL
IMM GRANULOCYTES NFR BLD AUTO: 0.7 %
LDLC SERPL CALC-MCNC: 99 MG/DL
LYMPHOCYTES # BLD AUTO: 1.61 K/UL
LYMPHOCYTES NFR BLD AUTO: 16.8 %
MAN DIFF?: NORMAL
MCHC RBC-ENTMCNC: 25.4 PG
MCHC RBC-ENTMCNC: 30.4 GM/DL
MCV RBC AUTO: 83.5 FL
MONOCYTES # BLD AUTO: 0.54 K/UL
MONOCYTES NFR BLD AUTO: 5.6 %
NEUTROPHILS # BLD AUTO: 7.05 K/UL
NEUTROPHILS NFR BLD AUTO: 73.6 %
NONHDLC SERPL-MCNC: 141 MG/DL
PLATELET # BLD AUTO: 214 K/UL
POTASSIUM SERPL-SCNC: 4.2 MMOL/L
PROT SERPL-MCNC: 7.5 G/DL
RBC # BLD: 4.8 M/UL
RBC # FLD: 16.6 %
SODIUM SERPL-SCNC: 143 MMOL/L
T4 FREE SERPL-MCNC: 1.1 NG/DL
TRIGL SERPL-MCNC: 207 MG/DL
TSH SERPL-ACNC: 0.3 UIU/ML
VIT B12 SERPL-MCNC: 1522 PG/ML
WBC # FLD AUTO: 9.59 K/UL

## 2022-11-10 DIAGNOSIS — R32 UNSPECIFIED URINARY INCONTINENCE: ICD-10-CM

## 2022-11-16 NOTE — ED PROVIDER NOTE - EYES NEGATIVE STATEMENT, MLM
Patient reports: 50% improvement post injection  Pain Level: 5-6/10  Patient stated painful at night  no discharge, no irritation, no pain, no redness, and no visual changes.

## 2022-12-19 ENCOUNTER — RX RENEWAL (OUTPATIENT)
Age: 62
End: 2022-12-19

## 2022-12-19 NOTE — ED PROVIDER NOTE - ATTENDING WITH...
PT comes to ED from home by [facility]. PT states that he has been having back pain for the last 2 weeks with no known injury and has been seeing the chiropractor for the last 2 weeks. PT was bent down to wash his face and felt a pop. The patient now has 9/10 pain that originates in the left lower back and has pain and discomfort in the left leg. PT respers are regular and unlabored at this time. Call light is within reach.
Resident

## 2023-03-03 NOTE — ASSESSMENT
[FreeTextEntry1] : Hemodynamically stable with acceptable BP\par No new neuro deficits appreciated; LE weakness consistent with history\par Lab profiles drawn in office and sent\par Flu vaccine given L deltoid\par B12 1ml IM given L deltoid at pt request\par Functional urinary incontinence without evidence of infection

## 2023-03-03 NOTE — REVIEW OF SYSTEMS
[Muscle Weakness] : muscle weakness [Unsteady Walking] : ataxia [Anxiety] : anxiety [Negative] : Genitourinary [Dysuria] : no dysuria [Incontinence] : incontinence [Nocturia] : nocturia [Frequency] : no frequency [FreeTextEntry1] : ROS somewhat unreliable

## 2023-03-03 NOTE — HISTORY OF PRESENT ILLNESS
[de-identified] : Presents for BP check, labs, and general follow-up.  Patient is S/P admission to [facility] for decompensation after the death of her daughter.  Reviewed with Psychiatrist (Dr. Bain) and also reviewed the discharge summary - pt was discharged on Diazepam to control anxiety but has declined any antidepressant and continues to do so.  History is difficult to obtain at this encounter, as patient is jumping from topic to topic - discussing her daughter, he late , her mother.  Pt has to be directed to the purpose of this visit, namely, an updated exam.  Pt does agree to have her BP checked, labs drawn; also agrees to the current flu vaccine; requests a B12 injection. \par Note pt has issues with ongoing and long-standing urinary incontinence, but denies new urgency or dysuria.

## 2023-03-03 NOTE — PHYSICAL EXAM
[No Acute Distress] : no acute distress [Normal] : normal rate, regular rhythm, normal S1 and S2 and no murmur heard [No Edema] : there was no peripheral edema [Soft] : abdomen soft [Non Tender] : non-tender [Normal Posterior Cervical Nodes] : no posterior cervical lymphadenopathy [Normal Anterior Cervical Nodes] : no anterior cervical lymphadenopathy [Ataxic, Wide-Based] : wide-based and ataxic [Motor Strength Lower Extremities Bilaterally] : there was weakness in both lower extremities [Speech Grossly Normal] : speech grossly normal [Normal Affect] : the affect was normal [de-identified] : using cane for ambulation [de-identified] : ataxic gait consistent with history [de-identified] : insight appears poor

## 2023-03-10 ENCOUNTER — TRANSCRIPTION ENCOUNTER (OUTPATIENT)
Age: 63
End: 2023-03-10

## 2023-03-10 ENCOUNTER — APPOINTMENT (OUTPATIENT)
Dept: FAMILY MEDICINE | Facility: CLINIC | Age: 63
End: 2023-03-10
Payer: MEDICAID

## 2023-03-10 VITALS
HEART RATE: 62 BPM | RESPIRATION RATE: 16 BRPM | WEIGHT: 203 LBS | BODY MASS INDEX: 33.82 KG/M2 | HEIGHT: 65 IN | TEMPERATURE: 98.2 F | DIASTOLIC BLOOD PRESSURE: 83 MMHG | OXYGEN SATURATION: 99 % | SYSTOLIC BLOOD PRESSURE: 139 MMHG

## 2023-03-10 DIAGNOSIS — G89.29 PAIN IN RIGHT KNEE: ICD-10-CM

## 2023-03-10 DIAGNOSIS — M25.561 PAIN IN RIGHT KNEE: ICD-10-CM

## 2023-03-10 DIAGNOSIS — M25.562 PAIN IN RIGHT KNEE: ICD-10-CM

## 2023-03-10 DIAGNOSIS — N18.9 CHRONIC KIDNEY DISEASE, UNSPECIFIED: ICD-10-CM

## 2023-03-10 PROCEDURE — 99215 OFFICE O/P EST HI 40 MIN: CPT | Mod: 25

## 2023-03-11 ENCOUNTER — TRANSCRIPTION ENCOUNTER (OUTPATIENT)
Age: 63
End: 2023-03-11

## 2023-03-13 NOTE — PHYSICAL EXAM
[No Acute Distress] : no acute distress [Well Nourished] : well nourished [Well Developed] : well developed [Well-Appearing] : well-appearing [Normal Sclera/Conjunctiva] : normal sclera/conjunctiva [PERRL] : pupils equal round and reactive to light [EOMI] : extraocular movements intact [Normal Outer Ear/Nose] : the outer ears and nose were normal in appearance [Normal Oropharynx] : the oropharynx was normal [No JVD] : no jugular venous distention [No Lymphadenopathy] : no lymphadenopathy [Supple] : supple [Thyroid Normal, No Nodules] : the thyroid was normal and there were no nodules present [No Respiratory Distress] : no respiratory distress  [No Accessory Muscle Use] : no accessory muscle use [Clear to Auscultation] : lungs were clear to auscultation bilaterally [Normal Rate] : normal rate  [Regular Rhythm] : with a regular rhythm [Normal S1, S2] : normal S1 and S2 [No Murmur] : no murmur heard [No Carotid Bruits] : no carotid bruits [No Abdominal Bruit] : a ~M bruit was not heard ~T in the abdomen [No Varicosities] : no varicosities [Pedal Pulses Present] : the pedal pulses are present [No Edema] : there was no peripheral edema [No Palpable Aorta] : no palpable aorta [No Extremity Clubbing/Cyanosis] : no extremity clubbing/cyanosis [Soft] : abdomen soft [Non Tender] : non-tender [Non-distended] : non-distended [No Masses] : no abdominal mass palpated [No HSM] : no HSM [Normal Bowel Sounds] : normal bowel sounds [Normal Posterior Cervical Nodes] : no posterior cervical lymphadenopathy [Normal Anterior Cervical Nodes] : no anterior cervical lymphadenopathy [No CVA Tenderness] : no CVA  tenderness [No Spinal Tenderness] : no spinal tenderness [No Joint Swelling] : no joint swelling [Grossly Normal Strength/Tone] : grossly normal strength/tone [No Rash] : no rash [Coordination Grossly Intact] : coordination grossly intact [No Focal Deficits] : no focal deficits [Normal Gait] : normal gait [Deep Tendon Reflexes (DTR)] : deep tendon reflexes were 2+ and symmetric [Normal Affect] : the affect was normal [Normal Insight/Judgement] : insight and judgment were intact [de-identified] : Unable to abduct lower extremities.  Moderate weakness of weight lower extremity noted.  Generalized edema and mild tenderness of bilateral knees.

## 2023-03-13 NOTE — ASSESSMENT
[FreeTextEntry1] : Patient has been off of pain medications for months.  Due to mental illness it is difficult to assess true acuity of pains.  Discussed pain management with PCP who is also resistant with prescribing pain medications for patient.  Initially we will reassess kidney function and comprehensive blood work.  Discussed alternative pain management including Tylenol, topical analgesics, stretching, and physical therapy.  Patient would benefit from neurology (questionable psychosomatic pains), psychiatry, orthopedic, and pain management referrals but patient declines.\par \par

## 2023-03-13 NOTE — HISTORY OF PRESENT ILLNESS
[de-identified] : Patient presents with persistent worsening chronic pains. \par Previous remote trauma to right hip. Continues to have pain in both legs. Right leg worse then left. \par Constant extremely sharp pain that radiates down both legs that are worse with ambulating.  Not receptive to any physical therapy or orthopedic evaluation.\par Patient does suffer from significant psychological conditions including depression and anxiety.  This past fall she was admitted for psychological evaluation.  Since then she has been taking Valium, but PCP stopped prescribing opioids.  She has been very resistant to following up with any specialists including psychiatry, neurology, or pain management.\par She denies symptoms of acute depression visit, and denies any suicidal ideation.\par \par

## 2023-03-14 ENCOUNTER — TRANSCRIPTION ENCOUNTER (OUTPATIENT)
Age: 63
End: 2023-03-14

## 2023-03-15 ENCOUNTER — TRANSCRIPTION ENCOUNTER (OUTPATIENT)
Age: 63
End: 2023-03-15

## 2023-03-16 NOTE — ED ADULT NURSE NOTE - SUICIDE SCREENING QUESTION 1
[facility]) Emergency [address] [facility]    Shae Mazariegos MD, am the primary clinician of record. CHIEF COMPLAINT  Chief Complaint   Patient presents with    Illness     Pt came in with her son due to having chills and fever, states that son goes to , and there has been a rash of flu cases. HISTORY OF PRESENT ILLNESS  Marcos Coffman is a 31 y.o. female  who presents to the ED complaining of chills body aches fevers at home as well as some nausea and cough. Patient is here with her son who has similar symptoms for several days. Both of them have had negative home COVID tests. Child has been exposed to influenza recently and the mother is worried about having this as well. No significant sputum chest pain or shortness of breath. No belly pain. No other complaints, modifying factors or associated symptoms. I have reviewed the following from the nursing documentation.     Past Medical History:   Diagnosis Date    Counseling on other sexually transmitted diseases 2012    Elevated LFTs 15    viral negative    History of eclampsia 2014    Overview:  Eclampsia postpartum after G1 in 2014    Mid-back pain, acute 2014    Otitis media, chronic, bilateral 2012    PIH (pregnancy induced hypertension) 2014    Positive TB test     negative chest XR    Preeclampsia 2014    post partum     Past Surgical History:   Procedure Laterality Date    ABDOMINOPLASTY      ADENOIDECTOMY      CARPAL TUNNEL RELEASE Right      SECTION      two    DENTAL SURGERY      root canal    TONSILLECTOMY      TUBAL LIGATION       Family History   Problem Relation Age of Onset    High Cholesterol Mother     Depression Mother     Arthritis Mother     Asthma Sister     Heart Failure Maternal Aunt     Arthritis Maternal Grandmother     High Blood Pressure Maternal Grandmother     Kidney Disease Maternal Grandmother     High Blood Pressure Maternal Grandfather     Vision Loss Maternal Grandfather         glaucoma    Heart Failure Maternal Aunt     Heart Disease Paternal Grandmother     Rheum Arthritis Neg Hx     Osteoarthritis Neg Hx     Breast Cancer Neg Hx     Cancer Neg Hx     Diabetes Neg Hx     Hypertension Neg Hx     Migraines Neg Hx     Ovarian Cancer Neg Hx     Rashes/Skin Problems Neg Hx     Seizures Neg Hx     Stroke Neg Hx     Thyroid Disease Neg Hx     Other Neg Hx         VTE     Social History     Socioeconomic History    Marital status:      Spouse name: Not on file    Number of children: 1    Years of education: HS    Highest education level: Not on file   Occupational History    Occupation:      Comment: [employer]    Occupation: stylist     Comment: [employer]    Occupation: [employer]     Employer: [employer]: since 9/3/13   Tobacco Use    Smoking status: Never    Smokeless tobacco: Never   Vaping Use    Vaping Use: Never used   Substance and Sexual Activity    Alcohol use: Yes     Alcohol/week: 0.0 standard drinks     Comment: occasionally     Drug use: No    Sexual activity: Yes     Partners: Male     Birth control/protection: Inserts     Comment: 1 partner. Implanon for [facility]   Other Topics Concern    Not on file   Social History Narrative    Lives in apt with son , Susan Hernandes, Almost 2 and his Dad     Social Determinants of Health     Financial Resource Strain: Not on file   Food Insecurity: Not on file   Transportation Needs: Not on file   Physical Activity: Not on file   Stress: Not on file   Social Connections: Not on file   Intimate Partner Violence: Not on file   Housing Stability: Not on file     No current facility-administered medications for this encounter.      Current Outpatient Medications   Medication Sig Dispense Refill    oseltamivir (TAMIFLU) 75 MG capsule Take 1 capsule by mouth 2 times daily for 5 days 10 capsule 0    ondansetron (ZOFRAN-ODT) 4 MG disintegrating tablet Take 1 tablet by mouth 3 times daily as needed for Nausea or Vomiting 21 tablet 0    ibuprofen (ADVIL) 200 MG tablet Take 3 tablets by mouth every 8 hours as needed for Pain 60 tablet 0    benzonatate (TESSALON) 100 MG capsule Take 1 capsule by mouth 2 times daily as needed for Cough 20 capsule 0    permethrin (ELIMITE) 5 % cream Apply topically, avoiding face. Leave on for 8 to 14 hours before showering. (Patient not taking: Reported on 3/15/2023) 1 each 0    ondansetron (ZOFRAN) 4 MG tablet Take 1 tablet by mouth 3 times daily as needed for Nausea or Vomiting (Patient not taking: Reported on 3/15/2023) 30 tablet 0    pantoprazole (PROTONIX) 20 MG tablet Take 1 tablet by mouth every morning (before breakfast) (Patient not taking: Reported on 3/15/2023) 90 tablet 1     Allergies   Allergen Reactions    Nickel      Topical         REVIEW OF SYSTEMS  6 systems reviewed, pertinent positives per HPI otherwise noted to be negative    PHYSICAL EXAM   /67   Pulse 83   Temp 98.6 °F (37 °C) (Oral)   Resp 16   Ht 5' 4\" (1.626 m)   Wt 178 lb (80.7 kg)   LMP 03/14/2023 (Exact Date)   SpO2 98%   BMI 30.55 kg/m²    GENERAL APPEARANCE: Awake and alert. Cooperative. No acute distress. HEAD: Normocephalic. Atraumatic. EYES: PERRL. EOM's grossly intact. ENT: Mucous membranes are moist.  Oropharynx minimally erythematous but no exudate noted, nasal congestion present. NECK: Supple. Normal ROM. CHEST: Equal symmetric chest rise. Regular rate and rhythm  LUNGS: Breathing is unlabored. Speaking comfortably in full sentences. Cough noted but clear to auscultation bilaterally without wheezing rhonchi or rales  ABDOMEN: Nondistended, nontender  EXTREMITIES: MAEE. No acute deformities. SKIN: Warm and dry. No acute rashes. NEUROLOGICAL: Alert and oriented. Strength is 5/5 in all extremities and sensation is intact. LABS  I have personally reviewed all labs for this visit.    Results for orders placed or performed during the hospital encounter of 03/15/23   Rapid influenza A/B antigens    Specimen: Nasopharyngeal   Result Value Ref Range    Rapid Influenza A Ag POSITIVE (A) Negative    Rapid Influenza B Ag Negative Negative   COVID-19, Rapid    Specimen: Nasopharyngeal Swab   Result Value Ref Range    SARS-CoV-2, NAAT Not Detected Not Detected     EKG performed in ED:  None      RADIOLOGY  Any applicable radiology studies including x-ray, CT, MRI, and/or ultrasound, were reviewed independently by me in addition to the radiologist.  I reviewed all radiology images and reports as well from this evaluation. No imaging performed        ED COURSE/MDM  Patient seen and evaluated. Old records reviewed. Labs and imaging reviewed. After initial evaluation, differential diagnostic considerations included but not limited to: pulmonary embolism, COPD/asthma, pneumonia, viral URI, nasal congestion, bacterial sinusitis, viral/strep pharyngitis, otitis media, otitis externa    The patient's ED workup was notable for viral syndrome type symptoms with a positive influenza A test.  The patient's son is also symptomatic and positive for the same virus. The patient has had symptoms for roughly 24 hours so is a candidate for Tamiflu and was prescribed this. She was also given Tylenol here with some improvement of her symptoms and she is afebrile and well-appearing without signs of sepsis or systemic illness or hypoxia. Her lungs are clear other than her cough so I do not feel that she needs antibiotics for any bacterial coverage or a chest x-ray at this time. COVID test is negative. Infectious precautions were discussed. Follow-up with PCP advised. Return to ED with any new or worsening symptoms in the meantime. She will be discharged with medications for symptom management including Tessalon Zofran and ibuprofen. Is this patient to be included in the SEP-1 Core Measure?   No   Exclusion criteria - the patient is NOT to be included for SEP-1 Core Measure due to:  Viral etiology found or highly suspected (including COVID-19) without concomitant bacterial infection      Consults:  None    History obtained from: Patient    Pertinent social determinants of health: None applicable    Chronic conditions potentially affecting care: None applicable    Review of other records:  None    Reassessment:  See [facility] for details of medications given and reassessment    During the patient's ED course, the patient was given:  Medications   acetaminophen (TYLENOL) tablet 1,000 mg (1,000 mg Oral Given 3/15/23 1901)        CLINICAL IMPRESSION  1. Influenza A        Blood pressure 115/67, pulse 83, temperature 98.6 °F (37 °C), temperature source Oral, resp. rate 16, height 5' 4\" (1.626 m), weight 178 lb (80.7 kg), last menstrual period 03/14/2023, SpO2 98 %, not currently breastfeeding. DISPOSITION  Shayy Vera was discharged in stable condition. I have discussed the findings of today's workup with the patient and addressed the patient's questions and concerns. Important warning signs as well as new or worsening symptoms which would necessitate immediate return to the ED were discussed. The plan is to discharge from the ED at this time, and the patient is in stable condition. The patient acknowledged understanding is agreeable with this plan. Patient was given scripts for the following medications. I counseled patient how to take these medications.    Discharge Medication List as of 3/15/2023  8:18 PM        START taking these medications    Details   oseltamivir (TAMIFLU) 75 MG capsule Take 1 capsule by mouth 2 times daily for 5 days, Disp-10 capsule, R-0Normal      ondansetron (ZOFRAN-ODT) 4 MG disintegrating tablet Take 1 tablet by mouth 3 times daily as needed for Nausea or Vomiting, Disp-21 tablet, R-0Normal      ibuprofen (ADVIL) 200 MG tablet Take 3 tablets by mouth every 8 hours as needed for Pain, Disp-60 tablet, R-0Normal      benzonatate (TESSALON) 100 MG capsule Take 1 capsule by mouth 2 times daily as No needed for Cough, Disp-20 capsule, R-0Normal             Follow-up with:  July Jay, APRN  [id number] [address] 34 [phone]    Schedule an appointment as soon as possible for a visit in 1 week  For symptom re-evaluation    [facility] Emergency Department  [address]  Go to   If symptoms worsen    Critical Care Time:  None    DISCLAIMER: This chart was created using Dragon dictation software. Efforts were made by me to ensure accuracy, however some errors may be present due to limitations of this technology and occasionally words are not transcribed correctly.         Devon Colón MD  03/15/23 0642

## 2023-03-17 ENCOUNTER — NON-APPOINTMENT (OUTPATIENT)
Age: 63
End: 2023-03-17

## 2023-03-22 LAB
ALBUMIN SERPL ELPH-MCNC: 4.8 G/DL
ALP BLD-CCNC: 108 U/L
ALT SERPL-CCNC: 10 U/L
ANION GAP SERPL CALC-SCNC: 17 MMOL/L
APPEARANCE: ABNORMAL
AST SERPL-CCNC: 14 U/L
BACTERIA: NEGATIVE
BASOPHILS # BLD AUTO: 0.08 K/UL
BASOPHILS NFR BLD AUTO: 0.9 %
BILIRUB SERPL-MCNC: 0.9 MG/DL
BILIRUBIN URINE: NEGATIVE
BLOOD URINE: NEGATIVE
BUN SERPL-MCNC: 14 MG/DL
CALCIUM SERPL-MCNC: 10.1 MG/DL
CHLORIDE SERPL-SCNC: 105 MMOL/L
CHOLEST SERPL-MCNC: 205 MG/DL
CO2 SERPL-SCNC: 21 MMOL/L
COLOR: YELLOW
CREAT SERPL-MCNC: 0.87 MG/DL
EGFR: 76 ML/MIN/1.73M2
EOSINOPHIL # BLD AUTO: 0.04 K/UL
EOSINOPHIL NFR BLD AUTO: 0.4 %
ESTIMATED AVERAGE GLUCOSE: 117 MG/DL
GLUCOSE QUALITATIVE U: NEGATIVE
GLUCOSE SERPL-MCNC: 103 MG/DL
HBA1C MFR BLD HPLC: 5.7 %
HCT VFR BLD CALC: 42.3 %
HDLC SERPL-MCNC: 51 MG/DL
HGB BLD-MCNC: 13 G/DL
HYALINE CASTS: 5 /LPF
IMM GRANULOCYTES NFR BLD AUTO: 0.3 %
KETONES URINE: NORMAL
LDLC SERPL CALC-MCNC: 121 MG/DL
LEUKOCYTE ESTERASE URINE: ABNORMAL
LYMPHOCYTES # BLD AUTO: 1.43 K/UL
LYMPHOCYTES NFR BLD AUTO: 15.3 %
MAN DIFF?: NORMAL
MCHC RBC-ENTMCNC: 25 PG
MCHC RBC-ENTMCNC: 30.7 GM/DL
MCV RBC AUTO: 81.3 FL
MICROSCOPIC-UA: NORMAL
MONOCYTES # BLD AUTO: 0.41 K/UL
MONOCYTES NFR BLD AUTO: 4.4 %
NEUTROPHILS # BLD AUTO: 7.34 K/UL
NEUTROPHILS NFR BLD AUTO: 78.7 %
NITRITE URINE: NEGATIVE
NONHDLC SERPL-MCNC: 154 MG/DL
PH URINE: 5.5
PLATELET # BLD AUTO: 317 K/UL
POTASSIUM SERPL-SCNC: 4.1 MMOL/L
PROT SERPL-MCNC: 7.9 G/DL
PROTEIN URINE: ABNORMAL
RBC # BLD: 5.2 M/UL
RBC # FLD: 15.6 %
RED BLOOD CELLS URINE: 13 /HPF
SODIUM SERPL-SCNC: 143 MMOL/L
SPECIFIC GRAVITY URINE: 1.03
SQUAMOUS EPITHELIAL CELLS: 19 /HPF
T4 FREE SERPL-MCNC: 1.3 NG/DL
TRIGL SERPL-MCNC: 162 MG/DL
TSH SERPL-ACNC: 0.16 UIU/ML
UROBILINOGEN URINE: NORMAL
WBC # FLD AUTO: 9.33 K/UL
WHITE BLOOD CELLS URINE: 15 /HPF

## 2023-03-27 ENCOUNTER — APPOINTMENT (OUTPATIENT)
Dept: FAMILY MEDICINE | Facility: CLINIC | Age: 63
End: 2023-03-27
Payer: MEDICAID

## 2023-03-27 VITALS
BODY MASS INDEX: 33.99 KG/M2 | WEIGHT: 204 LBS | HEART RATE: 68 BPM | DIASTOLIC BLOOD PRESSURE: 70 MMHG | SYSTOLIC BLOOD PRESSURE: 125 MMHG | RESPIRATION RATE: 20 BRPM | HEIGHT: 65 IN

## 2023-03-27 DIAGNOSIS — R19.7 DIARRHEA, UNSPECIFIED: ICD-10-CM

## 2023-03-27 PROCEDURE — 99214 OFFICE O/P EST MOD 30 MIN: CPT

## 2023-03-27 NOTE — PHYSICAL EXAM
[No Acute Distress] : no acute distress [No Edema] : there was no peripheral edema [Normal] : soft, non-tender, non-distended, no masses palpated, no HSM and normal bowel sounds [Normal Posterior Cervical Nodes] : no posterior cervical lymphadenopathy [Normal Anterior Cervical Nodes] : no anterior cervical lymphadenopathy [Ataxic] : ataxic [Motor Strength Lower Extremities Bilaterally] : there was weakness in both lower extremities [Limited Balance] : the patient's balance was impaired [Alert and Oriented x3] : oriented to person, place, and time

## 2023-03-27 NOTE — HISTORY OF PRESENT ILLNESS
[de-identified] : Presents for BP check and general follow-up.  Reviewed medication - states Diazepam is helping with neuropathic pain and spasm; also states the Questran powder has significantly helped with diarrhea.  Reviewed recent labs - states trying to eat healthy.

## 2023-03-27 NOTE — REVIEW OF SYSTEMS
[Muscle Weakness] : muscle weakness [Muscle Pain] : muscle pain [Unsteady Walking] : ataxia [Negative] : Genitourinary

## 2023-03-27 NOTE — ASSESSMENT
[FreeTextEntry1] : Hemodynamically stable with acceptable BP\par Abdominal exam benign\par Neuromuscular exam stable

## 2023-03-28 ENCOUNTER — TRANSCRIPTION ENCOUNTER (OUTPATIENT)
Age: 63
End: 2023-03-28

## 2023-04-13 ENCOUNTER — TRANSCRIPTION ENCOUNTER (OUTPATIENT)
Age: 63
End: 2023-04-13

## 2023-04-16 ENCOUNTER — RX RENEWAL (OUTPATIENT)
Age: 63
End: 2023-04-16

## 2023-04-17 ENCOUNTER — APPOINTMENT (OUTPATIENT)
Dept: FAMILY MEDICINE | Facility: CLINIC | Age: 63
End: 2023-04-17
Payer: MEDICAID

## 2023-04-18 ENCOUNTER — TRANSCRIPTION ENCOUNTER (OUTPATIENT)
Age: 63
End: 2023-04-18

## 2023-05-04 RX ORDER — IBUPROFEN 800 MG/1
800 TABLET, FILM COATED ORAL 3 TIMES DAILY
Qty: 30 | Refills: 3 | Status: COMPLETED | COMMUNITY
Start: 2017-08-04 | End: 2023-05-04

## 2023-05-04 RX ORDER — PROMETHAZINE HYDROCHLORIDE 12.5 MG/1
12.5 TABLET ORAL EVERY 6 HOURS
Qty: 20 | Refills: 1 | Status: COMPLETED | COMMUNITY
Start: 2022-11-01 | End: 2023-05-04

## 2023-05-05 ENCOUNTER — RX RENEWAL (OUTPATIENT)
Age: 63
End: 2023-05-05

## 2023-05-05 ENCOUNTER — TRANSCRIPTION ENCOUNTER (OUTPATIENT)
Age: 63
End: 2023-05-05

## 2023-05-08 ENCOUNTER — RX RENEWAL (OUTPATIENT)
Age: 63
End: 2023-05-08

## 2023-05-10 ENCOUNTER — TRANSCRIPTION ENCOUNTER (OUTPATIENT)
Age: 63
End: 2023-05-10

## 2023-05-29 ENCOUNTER — TRANSCRIPTION ENCOUNTER (OUTPATIENT)
Age: 63
End: 2023-05-29

## 2023-06-01 ENCOUNTER — APPOINTMENT (OUTPATIENT)
Dept: FAMILY MEDICINE | Facility: CLINIC | Age: 63
End: 2023-06-01
Payer: MEDICAID

## 2023-06-01 ENCOUNTER — LABORATORY RESULT (OUTPATIENT)
Age: 63
End: 2023-06-01

## 2023-06-01 VITALS
HEIGHT: 65 IN | HEART RATE: 76 BPM | RESPIRATION RATE: 20 BRPM | DIASTOLIC BLOOD PRESSURE: 80 MMHG | BODY MASS INDEX: 33.82 KG/M2 | WEIGHT: 203 LBS | SYSTOLIC BLOOD PRESSURE: 125 MMHG

## 2023-06-01 PROCEDURE — 99214 OFFICE O/P EST MOD 30 MIN: CPT | Mod: 25

## 2023-06-01 PROCEDURE — 36415 COLL VENOUS BLD VENIPUNCTURE: CPT

## 2023-06-01 NOTE — PHYSICAL EXAM
[No Acute Distress] : no acute distress [Normal] : normal rate, regular rhythm, normal S1 and S2 and no murmur heard [No Edema] : there was no peripheral edema [Soft] : abdomen soft [Non Tender] : non-tender [Normal Posterior Cervical Nodes] : no posterior cervical lymphadenopathy [Normal Anterior Cervical Nodes] : no anterior cervical lymphadenopathy [No CVA Tenderness] : no CVA  tenderness [No Focal Deficits] : no focal deficits [Limited Balance] : the patient's balance was impaired [Alert and Oriented x3] : oriented to person, place, and time

## 2023-06-01 NOTE — HISTORY OF PRESENT ILLNESS
[de-identified] : Presents for BP check, labs, and general follow-up.  Trying to maintain a healthy diet.  Does complain of some urinary frequency; note pt has been incontinent; but denies pain on urination.

## 2023-06-03 LAB
ALBUMIN SERPL ELPH-MCNC: 4.7 G/DL
ALP BLD-CCNC: 103 U/L
ALT SERPL-CCNC: 29 U/L
ANION GAP SERPL CALC-SCNC: 16 MMOL/L
APPEARANCE: CLEAR
AST SERPL-CCNC: 26 U/L
BILIRUB SERPL-MCNC: 0.9 MG/DL
BILIRUBIN URINE: NEGATIVE
BLOOD URINE: NEGATIVE
BUN SERPL-MCNC: 8 MG/DL
CALCIUM SERPL-MCNC: 9.8 MG/DL
CHLORIDE SERPL-SCNC: 105 MMOL/L
CHOLEST SERPL-MCNC: 154 MG/DL
CO2 SERPL-SCNC: 25 MMOL/L
COLOR: YELLOW
CREAT SERPL-MCNC: 0.77 MG/DL
EGFR: 88 ML/MIN/1.73M2
ESTIMATED AVERAGE GLUCOSE: 120 MG/DL
FOLATE SERPL-MCNC: 3.4 NG/ML
GLUCOSE QUALITATIVE U: NEGATIVE MG/DL
GLUCOSE SERPL-MCNC: 102 MG/DL
HBA1C MFR BLD HPLC: 5.8 %
HDLC SERPL-MCNC: 54 MG/DL
KETONES URINE: NEGATIVE MG/DL
LDLC SERPL CALC-MCNC: 77 MG/DL
LEUKOCYTE ESTERASE URINE: ABNORMAL
NITRITE URINE: NEGATIVE
NONHDLC SERPL-MCNC: 100 MG/DL
PH URINE: 6
POTASSIUM SERPL-SCNC: 3.9 MMOL/L
PROT SERPL-MCNC: 7.4 G/DL
PROTEIN URINE: NORMAL MG/DL
SODIUM SERPL-SCNC: 145 MMOL/L
SPECIFIC GRAVITY URINE: 1.01
T4 FREE SERPL-MCNC: 1.2 NG/DL
TRIGL SERPL-MCNC: 114 MG/DL
TSH SERPL-ACNC: 0.28 UIU/ML
UROBILINOGEN URINE: 0.2 MG/DL
VIT B12 SERPL-MCNC: 361 PG/ML

## 2023-06-04 ENCOUNTER — TRANSCRIPTION ENCOUNTER (OUTPATIENT)
Age: 63
End: 2023-06-04

## 2023-06-14 LAB
HAV IGM SER QL: NONREACTIVE
HBV CORE IGM SER QL: NONREACTIVE
HBV SURFACE AG SER QL: NONREACTIVE
HCV AB SER QL: NONREACTIVE
HCV S/CO RATIO: 0.11 S/CO

## 2023-06-18 ENCOUNTER — RX RENEWAL (OUTPATIENT)
Age: 63
End: 2023-06-18

## 2023-06-24 ENCOUNTER — TRANSCRIPTION ENCOUNTER (OUTPATIENT)
Age: 63
End: 2023-06-24

## 2023-06-26 ENCOUNTER — RX RENEWAL (OUTPATIENT)
Age: 63
End: 2023-06-26

## 2023-07-26 ENCOUNTER — RX RENEWAL (OUTPATIENT)
Age: 63
End: 2023-07-26

## 2023-08-10 ENCOUNTER — NON-APPOINTMENT (OUTPATIENT)
Age: 63
End: 2023-08-10

## 2023-08-15 ENCOUNTER — APPOINTMENT (OUTPATIENT)
Dept: FAMILY MEDICINE | Facility: CLINIC | Age: 63
End: 2023-08-15
Payer: MEDICAID

## 2023-08-15 VITALS — DIASTOLIC BLOOD PRESSURE: 78 MMHG | RESPIRATION RATE: 20 BRPM | HEART RATE: 68 BPM | SYSTOLIC BLOOD PRESSURE: 125 MMHG

## 2023-08-15 DIAGNOSIS — F41.9 ANXIETY DISORDER, UNSPECIFIED: ICD-10-CM

## 2023-08-15 PROCEDURE — 36415 COLL VENOUS BLD VENIPUNCTURE: CPT

## 2023-08-15 PROCEDURE — 99214 OFFICE O/P EST MOD 30 MIN: CPT | Mod: 25

## 2023-08-15 NOTE — ASSESSMENT
[FreeTextEntry1] : Hemodynamically stable with acceptable BP Have agreed to increase Diazepam to 10mg and observe carefully Lab profiles drawn in office and sent Discussed better sleep habits at length - dark, quiet room, TV OFF, white noise if needed instead of music from the phone

## 2023-08-15 NOTE — HISTORY OF PRESENT ILLNESS
[de-identified] : Presents for BP check, labs, and general follow-up.  States "always tired" but on questioning really doesn't have habits conducive to sleep - states keeps the TV on all night "without the sound;" keeps music playing on her phone - states has to push "skip" when a commercial comes on.  Continues to have daily anxiety after the passing of her daughter - requests an increase in the Diazepam - "it's only 5mg and I'm not exactly a small person."

## 2023-08-15 NOTE — PHYSICAL EXAM
[No Acute Distress] : no acute distress [Normal] : normal rate, regular rhythm, normal S1 and S2 and no murmur heard [No Edema] : there was no peripheral edema [Soft] : abdomen soft [Non Tender] : non-tender [Normal Posterior Cervical Nodes] : no posterior cervical lymphadenopathy [Normal Anterior Cervical Nodes] : no anterior cervical lymphadenopathy [Ataxic, Wide-Based] : wide-based and ataxic [Motor Strength Lower Extremities Bilaterally] : there was weakness in both lower extremities [No Focal Deficits] : no focal deficits [Speech Grossly Normal] : speech grossly normal [Memory Grossly Normal] : memory grossly normal [Normal Affect] : the affect was normal [Alert and Oriented x3] : oriented to person, place, and time [Normal Mood] : the mood was normal [Normal Insight/Judgement] : insight and judgment were intact [de-identified] : using two canes for ambulation

## 2023-08-16 LAB
ALBUMIN SERPL ELPH-MCNC: 4.5 G/DL
ALP BLD-CCNC: 98 U/L
ALT SERPL-CCNC: 14 U/L
ANION GAP SERPL CALC-SCNC: 16 MMOL/L
AST SERPL-CCNC: 17 U/L
BILIRUB SERPL-MCNC: 0.6 MG/DL
BUN SERPL-MCNC: 17 MG/DL
CALCIUM SERPL-MCNC: 9.5 MG/DL
CHLORIDE SERPL-SCNC: 108 MMOL/L
CHOLEST SERPL-MCNC: 203 MG/DL
CO2 SERPL-SCNC: 19 MMOL/L
CREAT SERPL-MCNC: 0.69 MG/DL
EGFR: 99 ML/MIN/1.73M2
FOLATE SERPL-MCNC: 17.3 NG/ML
GLUCOSE SERPL-MCNC: 97 MG/DL
HDLC SERPL-MCNC: 71 MG/DL
LDLC SERPL CALC-MCNC: 111 MG/DL
NONHDLC SERPL-MCNC: 133 MG/DL
POTASSIUM SERPL-SCNC: 4.2 MMOL/L
PROT SERPL-MCNC: 7.1 G/DL
SODIUM SERPL-SCNC: 142 MMOL/L
T4 FREE SERPL-MCNC: 1.1 NG/DL
TRIGL SERPL-MCNC: 122 MG/DL
TSH SERPL-ACNC: 0.94 UIU/ML
VIT B12 SERPL-MCNC: 500 PG/ML

## 2023-09-11 ENCOUNTER — RX RENEWAL (OUTPATIENT)
Age: 63
End: 2023-09-11

## 2023-09-16 ENCOUNTER — TRANSCRIPTION ENCOUNTER (OUTPATIENT)
Age: 63
End: 2023-09-16

## 2023-09-21 ENCOUNTER — NON-APPOINTMENT (OUTPATIENT)
Age: 63
End: 2023-09-21

## 2023-09-24 ENCOUNTER — RX RENEWAL (OUTPATIENT)
Age: 63
End: 2023-09-24

## 2023-09-25 ENCOUNTER — RX RENEWAL (OUTPATIENT)
Age: 63
End: 2023-09-25

## 2023-10-11 RX ORDER — CALCIUM CARBONATE 160(400)MG
TABLET,CHEWABLE ORAL
Qty: 1 | Refills: 0 | Status: ACTIVE | OUTPATIENT
Start: 2023-10-11

## 2023-10-23 ENCOUNTER — NON-APPOINTMENT (OUTPATIENT)
Age: 63
End: 2023-10-23

## 2023-10-31 ENCOUNTER — NON-APPOINTMENT (OUTPATIENT)
Age: 63
End: 2023-10-31

## 2023-11-02 ENCOUNTER — APPOINTMENT (OUTPATIENT)
Dept: FAMILY MEDICINE | Facility: CLINIC | Age: 63
End: 2023-11-02
Payer: MEDICAID

## 2023-11-02 ENCOUNTER — TRANSCRIPTION ENCOUNTER (OUTPATIENT)
Age: 63
End: 2023-11-02

## 2023-11-02 VITALS
HEART RATE: 72 BPM | BODY MASS INDEX: 31.16 KG/M2 | HEIGHT: 65 IN | SYSTOLIC BLOOD PRESSURE: 125 MMHG | TEMPERATURE: 97.6 F | DIASTOLIC BLOOD PRESSURE: 80 MMHG | OXYGEN SATURATION: 99 % | RESPIRATION RATE: 17 BRPM | WEIGHT: 187 LBS

## 2023-11-02 DIAGNOSIS — R05.9 COUGH, UNSPECIFIED: ICD-10-CM

## 2023-11-02 PROCEDURE — 99214 OFFICE O/P EST MOD 30 MIN: CPT

## 2023-11-02 RX ORDER — PROMETHAZINE HYDROCHLORIDE AND DEXTROMETHORPHAN HYDROBROMIDE ORAL SOLUTION 15; 6.25 MG/5ML; MG/5ML
6.25-15 SOLUTION ORAL
Qty: 200 | Refills: 0 | Status: ACTIVE | COMMUNITY
Start: 2023-11-02 | End: 1900-01-01

## 2023-11-03 ENCOUNTER — APPOINTMENT (OUTPATIENT)
Dept: FAMILY MEDICINE | Facility: CLINIC | Age: 63
End: 2023-11-03

## 2023-11-03 ENCOUNTER — NON-APPOINTMENT (OUTPATIENT)
Age: 63
End: 2023-11-03

## 2023-11-12 ENCOUNTER — RX RENEWAL (OUTPATIENT)
Age: 63
End: 2023-11-12

## 2023-11-14 ENCOUNTER — APPOINTMENT (OUTPATIENT)
Dept: FAMILY MEDICINE | Facility: CLINIC | Age: 63
End: 2023-11-14
Payer: MEDICAID

## 2023-11-14 VITALS
SYSTOLIC BLOOD PRESSURE: 125 MMHG | RESPIRATION RATE: 20 BRPM | DIASTOLIC BLOOD PRESSURE: 78 MMHG | BODY MASS INDEX: 32.99 KG/M2 | HEART RATE: 76 BPM | WEIGHT: 198 LBS | HEIGHT: 65 IN

## 2023-11-14 DIAGNOSIS — I25.10 ATHEROSCLEROTIC HEART DISEASE OF NATIVE CORONARY ARTERY W/OUT ANGINA PECTORIS: ICD-10-CM

## 2023-11-14 PROCEDURE — 99214 OFFICE O/P EST MOD 30 MIN: CPT | Mod: 25

## 2023-11-14 PROCEDURE — 36415 COLL VENOUS BLD VENIPUNCTURE: CPT

## 2023-11-15 LAB
ALBUMIN SERPL ELPH-MCNC: 4.5 G/DL
ALP BLD-CCNC: 90 U/L
ALT SERPL-CCNC: 18 U/L
ANION GAP SERPL CALC-SCNC: 12 MMOL/L
AST SERPL-CCNC: 22 U/L
BILIRUB SERPL-MCNC: 0.7 MG/DL
BUN SERPL-MCNC: 18 MG/DL
CALCIUM SERPL-MCNC: 9.1 MG/DL
CHLORIDE SERPL-SCNC: 104 MMOL/L
CHOLEST SERPL-MCNC: 151 MG/DL
CO2 SERPL-SCNC: 22 MMOL/L
CREAT SERPL-MCNC: 0.73 MG/DL
EGFR: 94 ML/MIN/1.73M2
ESTIMATED AVERAGE GLUCOSE: 114 MG/DL
FOLATE SERPL-MCNC: 8.7 NG/ML
GLUCOSE SERPL-MCNC: 77 MG/DL
HBA1C MFR BLD HPLC: 5.6 %
HCT VFR BLD CALC: 37.1 %
HDLC SERPL-MCNC: 71 MG/DL
HGB BLD-MCNC: 11.5 G/DL
LDLC SERPL CALC-MCNC: 65 MG/DL
MCHC RBC-ENTMCNC: 26.7 PG
MCHC RBC-ENTMCNC: 31 GM/DL
MCV RBC AUTO: 86.3 FL
NONHDLC SERPL-MCNC: 80 MG/DL
PLATELET # BLD AUTO: 257 K/UL
POTASSIUM SERPL-SCNC: 4.5 MMOL/L
PROT SERPL-MCNC: 7.2 G/DL
RBC # BLD: 4.3 M/UL
RBC # FLD: 15.8 %
SODIUM SERPL-SCNC: 139 MMOL/L
T4 FREE SERPL-MCNC: 1.1 NG/DL
TRIGL SERPL-MCNC: 76 MG/DL
TSH SERPL-ACNC: 0.56 UIU/ML
VIT B12 SERPL-MCNC: 468 PG/ML
WBC # FLD AUTO: 8.81 K/UL

## 2023-11-24 ENCOUNTER — EMERGENCY (EMERGENCY)
Facility: HOSPITAL | Age: 63
LOS: 1 days | Discharge: HOME | End: 2023-11-24
Attending: EMERGENCY MEDICINE | Admitting: EMERGENCY MEDICINE
Payer: MEDICAID

## 2023-11-24 VITALS
SYSTOLIC BLOOD PRESSURE: 173 MMHG | HEART RATE: 63 BPM | OXYGEN SATURATION: 97 % | TEMPERATURE: 98 F | WEIGHT: 195.11 LBS | DIASTOLIC BLOOD PRESSURE: 89 MMHG | RESPIRATION RATE: 18 BRPM

## 2023-11-24 VITALS
DIASTOLIC BLOOD PRESSURE: 81 MMHG | OXYGEN SATURATION: 98 % | HEART RATE: 64 BPM | SYSTOLIC BLOOD PRESSURE: 171 MMHG | RESPIRATION RATE: 18 BRPM

## 2023-11-24 LAB
ETHANOL SERPL-MCNC: <3 MG/DL — SIGNIFICANT CHANGE UP (ref 0–3)
ETHANOL SERPL-MCNC: <3 MG/DL — SIGNIFICANT CHANGE UP (ref 0–3)

## 2023-11-24 PROCEDURE — 72125 CT NECK SPINE W/O DYE: CPT | Mod: MA

## 2023-11-24 PROCEDURE — 90715 TDAP VACCINE 7 YRS/> IM: CPT

## 2023-11-24 PROCEDURE — 70450 CT HEAD/BRAIN W/O DYE: CPT | Mod: MA

## 2023-11-24 PROCEDURE — 96372 THER/PROPH/DIAG INJ SC/IM: CPT

## 2023-11-24 PROCEDURE — 80307 DRUG TEST PRSMV CHEM ANLYZR: CPT

## 2023-11-24 PROCEDURE — 36415 COLL VENOUS BLD VENIPUNCTURE: CPT

## 2023-11-24 PROCEDURE — 99284 EMERGENCY DEPT VISIT MOD MDM: CPT | Mod: 25

## 2023-11-24 PROCEDURE — 70486 CT MAXILLOFACIAL W/O DYE: CPT | Mod: 26,MA

## 2023-11-24 PROCEDURE — 70486 CT MAXILLOFACIAL W/O DYE: CPT | Mod: MA

## 2023-11-24 PROCEDURE — 70450 CT HEAD/BRAIN W/O DYE: CPT | Mod: 26,MA

## 2023-11-24 PROCEDURE — 90471 IMMUNIZATION ADMIN: CPT

## 2023-11-24 PROCEDURE — 99285 EMERGENCY DEPT VISIT HI MDM: CPT

## 2023-11-24 PROCEDURE — 72125 CT NECK SPINE W/O DYE: CPT | Mod: 26,MA

## 2023-11-24 RX ORDER — IBUPROFEN 200 MG
1 TABLET ORAL
Qty: 30 | Refills: 0
Start: 2023-11-24

## 2023-11-24 RX ORDER — MORPHINE SULFATE 50 MG/1
4 CAPSULE, EXTENDED RELEASE ORAL ONCE
Refills: 0 | Status: DISCONTINUED | OUTPATIENT
Start: 2023-11-24 | End: 2023-11-24

## 2023-11-24 RX ORDER — TETANUS TOXOID, REDUCED DIPHTHERIA TOXOID AND ACELLULAR PERTUSSIS VACCINE, ADSORBED 5; 2.5; 8; 8; 2.5 [IU]/.5ML; [IU]/.5ML; UG/.5ML; UG/.5ML; UG/.5ML
0.5 SUSPENSION INTRAMUSCULAR ONCE
Refills: 0 | Status: COMPLETED | OUTPATIENT
Start: 2023-11-24 | End: 2023-11-24

## 2023-11-24 RX ORDER — DIAZEPAM 5 MG
2 TABLET ORAL ONCE
Refills: 0 | Status: DISCONTINUED | OUTPATIENT
Start: 2023-11-24 | End: 2023-11-24

## 2023-11-24 RX ADMIN — Medication 2 MILLIGRAM(S): at 21:20

## 2023-11-24 RX ADMIN — MORPHINE SULFATE 4 MILLIGRAM(S): 50 CAPSULE, EXTENDED RELEASE ORAL at 21:20

## 2023-11-24 RX ADMIN — TETANUS TOXOID, REDUCED DIPHTHERIA TOXOID AND ACELLULAR PERTUSSIS VACCINE, ADSORBED 0.5 MILLILITER(S): 5; 2.5; 8; 8; 2.5 SUSPENSION INTRAMUSCULAR at 21:56

## 2023-11-24 NOTE — ED ADULT NURSE NOTE - NSFALLHARMRISKINTERV_ED_ALL_ED

## 2023-11-24 NOTE — ED PROVIDER NOTE - NSFOLLOWUPINSTRUCTIONS_ED_ALL_ED_FT
- -You were evaluated after a fall today.  You sustained a closed head injury and have a scalp hematoma and facial contusion  -Take Tylenol and or ibuprofen as needed for pain  -Also follow-up with your dentist in the next 1 to 2 weeks regarding dental cavities and possibility of abscesses seen on CAT scan  - you received a Tdap (tetanus) booster vaccination today    Follow Up as needed with your own doctor or with  [address] [address]  Phone: [phone]      Head Injury    WHAT YOU NEED TO KNOW:    A head injury can include your scalp, face, skull, or brain and range from mild to severe. Effects can appear immediately after the injury or develop later. The effects may last a short time or be permanent. Healthcare providers may want to check your recovery over time. Treatment may change as you recover or develop new health problems from the head injury.    DISCHARGE INSTRUCTIONS:    Call your local emergency number (911 in the [place]), or have someone else call if:     You cannot be woken.      You have a seizure.      You stop responding to others or you faint.      You have blurry or double vision.      Your speech becomes slurred or confused.      You have arm or leg weakness, loss of feeling, or new problems with coordination.      Your pupils are larger than usual, or one pupil is a different size than the other.      You have blood or clear fluid coming out of your ears or nose.    Return to the emergency department if:     You have repeated or forceful vomiting.      You feel confused.      Your headache gets worse or becomes severe.      You or someone caring for you notices that you are harder to wake than usual.    Call your doctor if:     Your symptoms last longer than 6 weeks after the injury.      You have questions or concerns about your condition or care.    Medicines:     Acetaminophen decreases pain and fever. It is available without a doctor's order. Ask how much to take and how often to take it. Follow directions. Read the labels of all other medicines you are using to see if they also contain acetaminophen, or ask your doctor or pharmacist. Acetaminophen can cause liver damage if not taken correctly. Do not use more than 4 grams (4,000 milligrams) total of acetaminophen in one day.       Take your medicine as directed. Contact your healthcare provider if you think your medicine is not helping or if you have side effects. Tell him or her if you are allergic to any medicine. Keep a list of the medicines, vitamins, and herbs you take. Include the amounts, and when and why you take them. Bring the list or the pill bottles to follow-up visits. Carry your medicine list with you in case of an emergency.    Self-care:     Rest or do quiet activities. Limit your time watching TV, using the computer, or doing tasks that require a lot of thinking. Slowly return to your normal activities as directed. Do not play sports or do activities that may cause you to get hit in the head. Ask your healthcare provider when you can return to sports.      Apply ice on your head for 15 to 20 minutes every hour or as directed. Use an ice pack, or put crushed ice in a plastic bag. Cover it with a towel before you apply it to your skin. Ice helps prevent tissue damage and decreases swelling and pain.      Have someone stay with you for 24 hours , or as directed. This person can monitor you for problems and call for help if needed. When you are awake, the person should ask you a few questions every few hours to see if you are thinking clearly. An example is to ask your name or address.    Prevent another head injury:     Wear a helmet that fits properly. Do this when you play sports, or ride a bike, scooter, or skateboard. Helmets help decrease your risk for a serious head injury. Talk to your healthcare provider about other ways you can protect yourself if you play sports.      Wear your seatbelt every time you are in a car. This helps lower your risk for a head injury if you are in a car accident.    Follow up with your doctor as directed: Write down your questions so you remember to ask them during your visits.    Tdap and Td Vaccines for Adults    WHAT YOU NEED TO KNOW:    Tdap is a shot given to protect you and others around you from tetanus, diphtheria, and pertussis. Td is a shot given to protect you from tetanus and diphtheria.    DISCHARGE INSTRUCTIONS:    Call your local emergency number (911 in the [place]) if:     Your mouth and throat are swollen.      You are wheezing or have trouble breathing.      You have chest pain or your heart is beating faster than usual.    Call your doctor if:     You have severe pain, redness, and swelling in your arm where the shot was given.      Your face is red or swollen.      You have hives that spread over your body.      You feel weak or dizzy.      You have a fever or chills.      You have a headache, body aches, or joint pain.      You have nausea or diarrhea, or you are vomiting.      You have questions or concerns about the vaccine.    Apply a warm compress to the injection area as directed to decrease pain and swelling.    Follow up with your doctor as directed: Write down your questions so you remember to ask them during your visits.

## 2023-11-24 NOTE — ED PROVIDER NOTE - PHYSICAL EXAMINATION
General:     NAD, well-nourished, well-appearing  Eyes: PERRL, white sclera  Head:     EOMI  Pharynx: pharynx wnl, oral mucosa moist  Neck:     trachea midline, no midline spinal tenderness.   Lungs:     CTA b/l  CVS:     RRR  Abd:     +BS, s/nt/nd  Ext:   no deformities, b/l knees and wrists are normal.   Skin: Large left scalp hematoma to the left frontotemporal head with overlying road rash and possible superficial laceration (will irrigate).   Neuro: AAOx3, no sensory/motor deficits, pt very restless.

## 2023-11-24 NOTE — ED ADULT TRIAGE NOTE - ARRIVAL FROM
HPI:      Patient is a 77y old  Female who presents with a chief complaint of inc right knee pain, h/o OA, now S/P Right TKA (23 Oct 2018 15:22)      Consulted by Dr. Hallman   for VTE prophylaxis, risk stratification, and anticoagulation management.    PAST MEDICAL & SURGICAL HISTORY:  Erbs muscular dystrophy: left arm  Thyroid cyst  Osteoarthritis  H/O spinal fusion:  lumbar          CrCl: 81.7    Caprini VTE Risk Score:CAPRINI SCORE [CLOT]    AGE RELATED RISK FACTORS                                                       MOBILITY RELATED FACTORS  [ ] Age 41-60 years                                            (1 Point)                  [ ] Bed rest                                                        (1 Point)  [ ] Age: 61-74 years                                           (2 Points)                 [ ] Plaster cast                                                   (2 Points)  [x ] Age= 75 years                                              (3 Points)                 [ ] Bed bound for more than 72 hours                 (2 Points)    DISEASE RELATED RISK FACTORS                                               GENDER SPECIFIC FACTORS  [ ] Edema in the lower extremities                       (1 Point)                  [ ] Pregnancy                                                     (1 Point)  [ ] Varicose veins                                               (1 Point)                  [ ] Post-partum < 6 weeks                                   (1 Point)             [x ] BMI > 25 Kg/m2                                            (1 Point)                  [ ] Hormonal therapy  or oral contraception          (1 Point)                 [ ] Sepsis (in the previous month)                        (1 Point)                  [ ] History of pregnancy complications                 (1 point)  [ ] Pneumonia or serious lung disease                                               [ ] Unexplained or recurrent                     (1 Point)           (in the previous month)                               (1 Point)  [ ] Abnormal pulmonary function test                     (1 Point)                 SURGERY RELATED RISK FACTORS  [ ] Acute myocardial infarction                              (1 Point)                 [ ]  Section                                             (1 Point)  [ ] Congestive heart failure (in the previous month)  (1 Point)               [ ] Minor surgery                                                  (1 Point)   [ ] Inflammatory bowel disease                             (1 Point)                 [ ] Arthroscopic surgery                                        (2 Points)  [ ] Central venous access                                      (2 Points)                [ ] General surgery lasting more than 45 minutes   (2 Points)       [ ] Stroke (in the previous month)                          (5 Points)               [ x] Elective arthroplasty                                         (5 Points)            [ ] H/O malignancy ( present or previous)            ( 2 points)                                                                                                                                   HEMATOLOGY RELATED FACTORS                                                 TRAUMA RELATED RISK FACTORS  [ ] Prior episodes of VTE                                     (3 Points)                 [ ] Fracture of the hip, pelvis, or leg                       (5 Points)  [ ] Positive family history for VTE                         (3 Points)                 [ ] Acute spinal cord injury (in the previous month)  (5 Points)  [ ] Prothrombin 46990 A                                     (3 Points)                 [ ] Paralysis  (less than 1 month)                             (5 Points)  [ ] Factor V Leiden                                             (3 Points)                  [ ] Multiple Trauma within 1 month                        (5 Points)  [ ] Lupus anticoagulants                                     (3 Points)                                                           [ ] Anticardiolipin antibodies                               (3 Points)                                                       [ ] High homocysteine in the blood                      (3 Points)                                             [ ] Other congenital or acquired thrombophilia      (3 Points)                                                [ ] Heparin induced thrombocytopenia                  (3 Points)                                          Total Score [    9      ]      IMPROVE Bleeding Risk Score #2.5    Falls Risk:   High ( x )  Mod (  )  Low (  )      FAMILY HISTORY:    Denies any personal or familial history of clotting or bleeding disorders.    Allergies    No Known Allergies    Intolerances        REVIEW OF SYSTEMS    (  )Fever	     (  )Constipation	(  )SOB				(  )Headache	(  )Dysuria  (  )Chills	     (  )Melena	(  )Dyspnea present on exertion	                    (  )Dizziness                    (  )Polyuria  (  )Nausea	     (  )Hematochezia	(  )Cough			                    (  )Syncope   	(  )Hematuria  (  )Vomiting    (  )Chest Pain	(  )Wheezing			(  )Weakness  (  )Diarrhea     (  )Palpitations	(  )Anorexia			(  )Myalgia    All other review of systems negative: Yes          PHYSICAL EXAM:    Constitutional: Appears Well    Neurological: A& O x 3    Skin: Warm    Respiratory and Thorax: normal effort; Breath sounds: normal; No rales/wheezing/rhonchi  	  Cardiovascular: S1, S2, regular, NMBR	    Gastrointestinal: BS + x 4Q, nontender	    Genitourinary:  Bladder nondistended, nontender    Musculoskeletal:   General Right:   no muscle/joint tenderness,   normal tone, no joint swelling,   ROM: limited	    General Left:   no muscle/joint tenderness,   normal tone, no joint swelling,   ROM: full      Knee:  Right: Incision: ; Dressing CDI;       Lower extrems:   Right: no calf tenderness              negative manju's sign               + pedal pulses    Left:   no calf tenderness              negative manju's sign               + pedal pulses                          11.6   7.00  )-----------( 199      ( 23 Oct 2018 15:42 )             35.0       10-    141  |  110<H>  |  19  ----------------------------<  93  3.7   |  23  |  0.71    Ca    8.6      23 Oct 2018 15:42        PT/INR - ( 23 Oct 2018 12:43 )   PT: 11.6 sec;   INR: 1.07 ratio         PTT - ( 23 Oct 2018 12:43 )  PTT:29.3 sec				    MEDICATIONS  (STANDING):  acetaminophen   Tablet .. 650 milliGRAM(s) Oral every 6 hours  celecoxib 200 milliGRAM(s) Oral with breakfast  docusate sodium 100 milliGRAM(s) Oral every 12 hours  influenza   Vaccine 0.5 milliLiter(s) IntraMuscular once  lactated ringers. 1000 milliLiter(s) IV Continuous <Continuous>  multivitamin 1 Tablet(s) Oral daily  oxyCODONE  ER Tablet 10 milliGRAM(s) Oral every 12 hours  rivaroxaban 10 milliGRAM(s) Oral daily      Vital Signs Last 24 Hrs  T(C): 36.7 (10-23-18 @ 15:05), Max: 37.1 (10-23-18 @ 11:51)  T(F): 98 (10-23-18 @ 15:05), Max: 98.7 (10-23-18 @ 11:51)  HR: 76 (10-23-18 @ 16:30) (71 - 92)  BP: 112/47 (10-23-18 @ 16:30) (97/43 - 128/52)  BP(mean): --  RR: 17 (10-23-18 @ 16:30) (11 - 17)  SpO2: 98% (10-23-18 @ 16:30) (97% - 100%)    DVT Prophylaxis:  LMWH                   (  )  Heparin SQ           (  )  Coumadin             (  )  Xarelto                  (x  )  Eliquis                   (  )  Venodynes           (x  )  Ambulation          (  )  UFH                       (  )  Contraindicated  (  ) Street

## 2023-11-24 NOTE — ED PROVIDER NOTE - PATIENT PORTAL LINK FT
You can access the FollowMyHealth Patient Portal offered by [facility] by registering at the following website: [email or web address]. By joining [facility]’s FollowMyHealth portal, you will also be able to view your health information using other applications (apps) compatible with our system.

## 2023-11-24 NOTE — ED PROVIDER NOTE - CLINICAL SUMMARY MEDICAL DECISION MAKING FREE TEXT BOX
63-year-old female on Plavix presents to the ED after falling face down on the sidewalk.  No LOC.  Patient complaining of headache and sustained a large hematoma to the left forehead with bleeding.  Tetanus up-to-date.  Patient has history of restless legs as well.  Patient states secondary to this fall she feels very restless.  No numbness or tingling.  No nausea or vomiting.  Exam as stated. Plan for CT maxfac/head/cspine. Pain med and valium as pt is visibly restless. 63-year-old female on Plavix presents to the ED after falling face down on the sidewalk.  No LOC.  Patient complaining of headache and sustained a large hematoma to the left forehead with bleeding.  Tetanus up-to-date.  Patient has history of restless legs as well.  Patient states secondary to this fall she feels very restless.  No numbness or tingling.  No nausea or vomiting.  Exam as stated. Plan for CT maxfac/head/cspine. Pain med and valium as pt is visibly restless.  Irrigated with copious saline. No laceration. Abrasion. Bacitracin and telfa/gauze combo placed.   Patient signed out to incoming physician.  All decisions regarding the progression of care will be made at their discretion. Pending lab etoh to determine safety in ambulation and discharge given restlessness and CT results. 63-year-old female on Plavix presents to the ED after falling face down on the sidewalk.  No LOC.  Patient complaining of headache and sustained a large hematoma to the left forehead with bleeding.  Tetanus up-to-date.  Patient has history of restless legs as well.  Patient states secondary to this fall she feels very restless.  No numbness or tingling.  No nausea or vomiting.  Exam as stated. Plan for CT maxfac/head/cspine. Pain med and valium as pt is visibly restless.  Irrigated with copious saline. No laceration. Abrasion. Bacitracin and telfa/gauze combo placed.   Patient signed out to incoming physician.  All decisions regarding the progression of care will be made at their discretion. Pending lab etoh to determine safety in ambulation and discharge given restlessness and CT results.    2216: ct head /face/cspine no signif acute trauma. pt told to f/u c dental regarding dental cavities and ?abscesses on ct. pt a&ox3, coherent. has steady gait. nad.

## 2023-11-24 NOTE — ED PROVIDER NOTE - NSFOLLOWUPCLINICS_GEN_ALL_ED_FT
[facility]  Dental  [address] [place] [id number]  Phone: [phone]  Fax:   Follow Up Time: Routine

## 2023-11-24 NOTE — ED PROVIDER NOTE - OBJECTIVE STATEMENT
63-year-old female on Plavix presents to the ED after falling face down on the sidewalk.  No LOC.  Patient complaining of headache and sustained a large hematoma to the left forehead with bleeding.  Tetanus up-to-date.  Patient has history of restless legs as well.  Patient states secondary to this fall she feels very restless.  No numbness or tingling.  No nausea or vomiting.

## 2023-11-24 NOTE — ED ADULT TRIAGE NOTE - CHIEF COMPLAINT QUOTE
Pt BIBA s/p trip and fall while getting out of the car. Pt has hematoma to forehead. Denies LOC. Pt on plavix

## 2023-11-25 ENCOUNTER — TRANSCRIPTION ENCOUNTER (OUTPATIENT)
Age: 63
End: 2023-11-25

## 2023-11-26 ENCOUNTER — TRANSCRIPTION ENCOUNTER (OUTPATIENT)
Age: 63
End: 2023-11-26

## 2023-11-26 ENCOUNTER — INPATIENT (INPATIENT)
Facility: HOSPITAL | Age: 63
LOS: 4 days | Discharge: NURSING, REHAB OR LONG-TERM CARE | DRG: 125 | End: 2023-12-01
Attending: INTERNAL MEDICINE | Admitting: HOSPITALIST
Payer: MEDICAID

## 2023-11-26 VITALS
DIASTOLIC BLOOD PRESSURE: 95 MMHG | TEMPERATURE: 98 F | SYSTOLIC BLOOD PRESSURE: 152 MMHG | HEIGHT: 63 IN | OXYGEN SATURATION: 97 % | WEIGHT: 184.97 LBS | HEART RATE: 55 BPM | RESPIRATION RATE: 20 BRPM

## 2023-11-26 LAB
ALBUMIN SERPL ELPH-MCNC: 3.5 G/DL — SIGNIFICANT CHANGE UP (ref 3.3–5)
ALBUMIN SERPL ELPH-MCNC: 3.5 G/DL — SIGNIFICANT CHANGE UP (ref 3.3–5)
ALP SERPL-CCNC: 88 U/L — SIGNIFICANT CHANGE UP (ref 40–120)
ALP SERPL-CCNC: 88 U/L — SIGNIFICANT CHANGE UP (ref 40–120)
ALT FLD-CCNC: 26 U/L — SIGNIFICANT CHANGE UP (ref 10–45)
ALT FLD-CCNC: 26 U/L — SIGNIFICANT CHANGE UP (ref 10–45)
ANION GAP SERPL CALC-SCNC: 6 MMOL/L — SIGNIFICANT CHANGE UP (ref 5–17)
ANION GAP SERPL CALC-SCNC: 6 MMOL/L — SIGNIFICANT CHANGE UP (ref 5–17)
AST SERPL-CCNC: 21 U/L — SIGNIFICANT CHANGE UP (ref 10–40)
AST SERPL-CCNC: 21 U/L — SIGNIFICANT CHANGE UP (ref 10–40)
BASOPHILS # BLD AUTO: 0.06 K/UL — SIGNIFICANT CHANGE UP (ref 0–0.2)
BASOPHILS # BLD AUTO: 0.06 K/UL — SIGNIFICANT CHANGE UP (ref 0–0.2)
BASOPHILS NFR BLD AUTO: 0.8 % — SIGNIFICANT CHANGE UP (ref 0–2)
BASOPHILS NFR BLD AUTO: 0.8 % — SIGNIFICANT CHANGE UP (ref 0–2)
BILIRUB SERPL-MCNC: 1 MG/DL — SIGNIFICANT CHANGE UP (ref 0.2–1.2)
BILIRUB SERPL-MCNC: 1 MG/DL — SIGNIFICANT CHANGE UP (ref 0.2–1.2)
BUN SERPL-MCNC: 17 MG/DL — SIGNIFICANT CHANGE UP (ref 7–23)
BUN SERPL-MCNC: 17 MG/DL — SIGNIFICANT CHANGE UP (ref 7–23)
CALCIUM SERPL-MCNC: 8.7 MG/DL — SIGNIFICANT CHANGE UP (ref 8.4–10.5)
CALCIUM SERPL-MCNC: 8.7 MG/DL — SIGNIFICANT CHANGE UP (ref 8.4–10.5)
CHLORIDE SERPL-SCNC: 106 MMOL/L — SIGNIFICANT CHANGE UP (ref 96–108)
CHLORIDE SERPL-SCNC: 106 MMOL/L — SIGNIFICANT CHANGE UP (ref 96–108)
CO2 SERPL-SCNC: 28 MMOL/L — SIGNIFICANT CHANGE UP (ref 22–31)
CO2 SERPL-SCNC: 28 MMOL/L — SIGNIFICANT CHANGE UP (ref 22–31)
CREAT SERPL-MCNC: 0.61 MG/DL — SIGNIFICANT CHANGE UP (ref 0.5–1.3)
CREAT SERPL-MCNC: 0.61 MG/DL — SIGNIFICANT CHANGE UP (ref 0.5–1.3)
EGFR: 102 ML/MIN/1.73M2 — SIGNIFICANT CHANGE UP
EGFR: 102 ML/MIN/1.73M2 — SIGNIFICANT CHANGE UP
EOSINOPHIL # BLD AUTO: 0.46 K/UL — SIGNIFICANT CHANGE UP (ref 0–0.5)
EOSINOPHIL # BLD AUTO: 0.46 K/UL — SIGNIFICANT CHANGE UP (ref 0–0.5)
EOSINOPHIL NFR BLD AUTO: 5.9 % — SIGNIFICANT CHANGE UP (ref 0–6)
EOSINOPHIL NFR BLD AUTO: 5.9 % — SIGNIFICANT CHANGE UP (ref 0–6)
GLUCOSE SERPL-MCNC: 92 MG/DL — SIGNIFICANT CHANGE UP (ref 70–99)
GLUCOSE SERPL-MCNC: 92 MG/DL — SIGNIFICANT CHANGE UP (ref 70–99)
HCT VFR BLD CALC: 35.6 % — SIGNIFICANT CHANGE UP (ref 34.5–45)
HCT VFR BLD CALC: 35.6 % — SIGNIFICANT CHANGE UP (ref 34.5–45)
HGB BLD-MCNC: 11.2 G/DL — LOW (ref 11.5–15.5)
HGB BLD-MCNC: 11.2 G/DL — LOW (ref 11.5–15.5)
IMM GRANULOCYTES NFR BLD AUTO: 0.3 % — SIGNIFICANT CHANGE UP (ref 0–0.9)
IMM GRANULOCYTES NFR BLD AUTO: 0.3 % — SIGNIFICANT CHANGE UP (ref 0–0.9)
LYMPHOCYTES # BLD AUTO: 1.87 K/UL — SIGNIFICANT CHANGE UP (ref 1–3.3)
LYMPHOCYTES # BLD AUTO: 1.87 K/UL — SIGNIFICANT CHANGE UP (ref 1–3.3)
LYMPHOCYTES # BLD AUTO: 24 % — SIGNIFICANT CHANGE UP (ref 13–44)
LYMPHOCYTES # BLD AUTO: 24 % — SIGNIFICANT CHANGE UP (ref 13–44)
MCHC RBC-ENTMCNC: 27.4 PG — SIGNIFICANT CHANGE UP (ref 27–34)
MCHC RBC-ENTMCNC: 27.4 PG — SIGNIFICANT CHANGE UP (ref 27–34)
MCHC RBC-ENTMCNC: 31.5 GM/DL — LOW (ref 32–36)
MCHC RBC-ENTMCNC: 31.5 GM/DL — LOW (ref 32–36)
MCV RBC AUTO: 87 FL — SIGNIFICANT CHANGE UP (ref 80–100)
MCV RBC AUTO: 87 FL — SIGNIFICANT CHANGE UP (ref 80–100)
MONOCYTES # BLD AUTO: 0.51 K/UL — SIGNIFICANT CHANGE UP (ref 0–0.9)
MONOCYTES # BLD AUTO: 0.51 K/UL — SIGNIFICANT CHANGE UP (ref 0–0.9)
MONOCYTES NFR BLD AUTO: 6.5 % — SIGNIFICANT CHANGE UP (ref 2–14)
MONOCYTES NFR BLD AUTO: 6.5 % — SIGNIFICANT CHANGE UP (ref 2–14)
NEUTROPHILS # BLD AUTO: 4.88 K/UL — SIGNIFICANT CHANGE UP (ref 1.8–7.4)
NEUTROPHILS # BLD AUTO: 4.88 K/UL — SIGNIFICANT CHANGE UP (ref 1.8–7.4)
NEUTROPHILS NFR BLD AUTO: 62.5 % — SIGNIFICANT CHANGE UP (ref 43–77)
NEUTROPHILS NFR BLD AUTO: 62.5 % — SIGNIFICANT CHANGE UP (ref 43–77)
NRBC # BLD: 0 /100 WBCS — SIGNIFICANT CHANGE UP (ref 0–0)
NRBC # BLD: 0 /100 WBCS — SIGNIFICANT CHANGE UP (ref 0–0)
PLATELET # BLD AUTO: 237 K/UL — SIGNIFICANT CHANGE UP (ref 150–400)
PLATELET # BLD AUTO: 237 K/UL — SIGNIFICANT CHANGE UP (ref 150–400)
POTASSIUM SERPL-MCNC: 3.8 MMOL/L — SIGNIFICANT CHANGE UP (ref 3.5–5.3)
POTASSIUM SERPL-MCNC: 3.8 MMOL/L — SIGNIFICANT CHANGE UP (ref 3.5–5.3)
POTASSIUM SERPL-SCNC: 3.8 MMOL/L — SIGNIFICANT CHANGE UP (ref 3.5–5.3)
POTASSIUM SERPL-SCNC: 3.8 MMOL/L — SIGNIFICANT CHANGE UP (ref 3.5–5.3)
PROT SERPL-MCNC: 7.2 G/DL — SIGNIFICANT CHANGE UP (ref 6–8.3)
PROT SERPL-MCNC: 7.2 G/DL — SIGNIFICANT CHANGE UP (ref 6–8.3)
RBC # BLD: 4.09 M/UL — SIGNIFICANT CHANGE UP (ref 3.8–5.2)
RBC # BLD: 4.09 M/UL — SIGNIFICANT CHANGE UP (ref 3.8–5.2)
RBC # FLD: 15.6 % — HIGH (ref 10.3–14.5)
RBC # FLD: 15.6 % — HIGH (ref 10.3–14.5)
SODIUM SERPL-SCNC: 140 MMOL/L — SIGNIFICANT CHANGE UP (ref 135–145)
SODIUM SERPL-SCNC: 140 MMOL/L — SIGNIFICANT CHANGE UP (ref 135–145)
WBC # BLD: 7.8 K/UL — SIGNIFICANT CHANGE UP (ref 3.8–10.5)
WBC # BLD: 7.8 K/UL — SIGNIFICANT CHANGE UP (ref 3.8–10.5)
WBC # FLD AUTO: 7.8 K/UL — SIGNIFICANT CHANGE UP (ref 3.8–10.5)
WBC # FLD AUTO: 7.8 K/UL — SIGNIFICANT CHANGE UP (ref 3.8–10.5)

## 2023-11-26 PROCEDURE — 99285 EMERGENCY DEPT VISIT HI MDM: CPT

## 2023-11-26 RX ORDER — METOPROLOL TARTRATE 50 MG
100 TABLET ORAL
Refills: 0 | Status: DISCONTINUED | OUTPATIENT
Start: 2023-11-26 | End: 2023-11-30

## 2023-11-26 RX ORDER — MORPHINE SULFATE 50 MG/1
4 CAPSULE, EXTENDED RELEASE ORAL ONCE
Refills: 0 | Status: DISCONTINUED | OUTPATIENT
Start: 2023-11-26 | End: 2023-11-26

## 2023-11-26 RX ORDER — ACETAMINOPHEN 500 MG
650 TABLET ORAL EVERY 6 HOURS
Refills: 0 | Status: DISCONTINUED | OUTPATIENT
Start: 2023-11-26 | End: 2023-11-26

## 2023-11-26 RX ORDER — CLOPIDOGREL BISULFATE 75 MG/1
75 TABLET, FILM COATED ORAL DAILY
Refills: 0 | Status: DISCONTINUED | OUTPATIENT
Start: 2023-11-26 | End: 2023-12-01

## 2023-11-26 RX ORDER — OXYCODONE HYDROCHLORIDE 5 MG/1
5 TABLET ORAL ONCE
Refills: 0 | Status: DISCONTINUED | OUTPATIENT
Start: 2023-11-26 | End: 2023-11-26

## 2023-11-26 RX ORDER — ATORVASTATIN CALCIUM 80 MG/1
40 TABLET, FILM COATED ORAL AT BEDTIME
Refills: 0 | Status: DISCONTINUED | OUTPATIENT
Start: 2023-11-26 | End: 2023-12-01

## 2023-11-26 RX ORDER — DIAZEPAM 5 MG
10 TABLET ORAL THREE TIMES A DAY
Refills: 0 | Status: DISCONTINUED | OUTPATIENT
Start: 2023-11-26 | End: 2023-12-01

## 2023-11-26 RX ORDER — ONDANSETRON 8 MG/1
4 TABLET, FILM COATED ORAL EVERY 8 HOURS
Refills: 0 | Status: DISCONTINUED | OUTPATIENT
Start: 2023-11-26 | End: 2023-12-01

## 2023-11-26 RX ORDER — CHOLESTYRAMINE 4 G/9G
4 POWDER, FOR SUSPENSION ORAL
Refills: 0 | Status: DISCONTINUED | OUTPATIENT
Start: 2023-11-26 | End: 2023-12-01

## 2023-11-26 RX ORDER — METOCLOPRAMIDE HCL 10 MG
10 TABLET ORAL ONCE
Refills: 0 | Status: COMPLETED | OUTPATIENT
Start: 2023-11-26 | End: 2023-11-26

## 2023-11-26 RX ORDER — OXYCODONE HYDROCHLORIDE 5 MG/1
5 TABLET ORAL EVERY 4 HOURS
Refills: 0 | Status: DISCONTINUED | OUTPATIENT
Start: 2023-11-26 | End: 2023-12-01

## 2023-11-26 RX ORDER — OXYCODONE HYDROCHLORIDE 5 MG/1
1 TABLET ORAL
Qty: 9 | Refills: 0
Start: 2023-11-26 | End: 2023-11-28

## 2023-11-26 RX ORDER — LORATADINE 10 MG/1
10 TABLET ORAL DAILY
Refills: 0 | Status: DISCONTINUED | OUTPATIENT
Start: 2023-11-26 | End: 2023-12-01

## 2023-11-26 RX ORDER — MORPHINE SULFATE 50 MG/1
4 CAPSULE, EXTENDED RELEASE ORAL EVERY 4 HOURS
Refills: 0 | Status: DISCONTINUED | OUTPATIENT
Start: 2023-11-26 | End: 2023-11-28

## 2023-11-26 RX ORDER — CYCLOBENZAPRINE HYDROCHLORIDE 10 MG/1
5 TABLET, FILM COATED ORAL ONCE
Refills: 0 | Status: COMPLETED | OUTPATIENT
Start: 2023-11-26 | End: 2023-11-26

## 2023-11-26 RX ORDER — ACETAMINOPHEN 500 MG
650 TABLET ORAL EVERY 6 HOURS
Refills: 0 | Status: DISCONTINUED | OUTPATIENT
Start: 2023-11-26 | End: 2023-12-01

## 2023-11-26 RX ORDER — LISINOPRIL 2.5 MG/1
5 TABLET ORAL DAILY
Refills: 0 | Status: DISCONTINUED | OUTPATIENT
Start: 2023-11-26 | End: 2023-12-01

## 2023-11-26 RX ORDER — LANOLIN ALCOHOL/MO/W.PET/CERES
3 CREAM (GRAM) TOPICAL AT BEDTIME
Refills: 0 | Status: DISCONTINUED | OUTPATIENT
Start: 2023-11-26 | End: 2023-12-01

## 2023-11-26 RX ORDER — CYCLOBENZAPRINE HYDROCHLORIDE 10 MG/1
5 TABLET, FILM COATED ORAL THREE TIMES A DAY
Refills: 0 | Status: DISCONTINUED | OUTPATIENT
Start: 2023-11-26 | End: 2023-11-26

## 2023-11-26 RX ORDER — ACETAMINOPHEN 500 MG
1000 TABLET ORAL ONCE
Refills: 0 | Status: COMPLETED | OUTPATIENT
Start: 2023-11-26 | End: 2023-11-26

## 2023-11-26 RX ADMIN — Medication 650 MILLIGRAM(S): at 19:47

## 2023-11-26 RX ADMIN — Medication 650 MILLIGRAM(S): at 20:27

## 2023-11-26 RX ADMIN — CYCLOBENZAPRINE HYDROCHLORIDE 5 MILLIGRAM(S): 10 TABLET, FILM COATED ORAL at 17:20

## 2023-11-26 RX ADMIN — Medication 10 MILLIGRAM(S): at 21:35

## 2023-11-26 RX ADMIN — Medication 100 MILLIGRAM(S): at 19:47

## 2023-11-26 RX ADMIN — Medication 400 MILLIGRAM(S): at 15:59

## 2023-11-26 RX ADMIN — ATORVASTATIN CALCIUM 40 MILLIGRAM(S): 80 TABLET, FILM COATED ORAL at 21:35

## 2023-11-26 RX ADMIN — OXYCODONE HYDROCHLORIDE 5 MILLIGRAM(S): 5 TABLET ORAL at 22:47

## 2023-11-26 RX ADMIN — Medication 10 MILLIGRAM(S): at 16:00

## 2023-11-26 RX ADMIN — OXYCODONE HYDROCHLORIDE 5 MILLIGRAM(S): 5 TABLET ORAL at 23:27

## 2023-11-26 RX ADMIN — MORPHINE SULFATE 4 MILLIGRAM(S): 50 CAPSULE, EXTENDED RELEASE ORAL at 17:20

## 2023-11-26 RX ADMIN — CHOLESTYRAMINE 4 GRAM(S): 4 POWDER, FOR SUSPENSION ORAL at 19:48

## 2023-11-26 RX ADMIN — OXYCODONE HYDROCHLORIDE 5 MILLIGRAM(S): 5 TABLET ORAL at 13:49

## 2023-11-26 NOTE — H&P ADULT - NSHPPHYSICALEXAM_GEN_ALL_CORE
Patient calling. Both of her legs feel like leather and she stated she needs to rub them to make them feel softer and able to walk well. Please advise. Ok to call and susie [phone]  
See Outernet message.   
Vital Signs Last 24 Hrs  T(F): 97.9 (26 Nov 2023 12:52), Max: 97.9 (26 Nov 2023 12:52)  HR: 76 (26 Nov 2023 15:08) (55 - 76)  BP: 164/81 (26 Nov 2023 15:08) (152/95 - 164/81)  RR: 18 (26 Nov 2023 15:08) (18 - 20)  SpO2: 100% (26 Nov 2023 15:08) (97% - 100%)    PHYSICAL EXAM:  GENERAL: NAD, well-groomed, well-developed  HEAD:  facial ecchymosis , Normocephalic  EYES: EOMI, conjunctival hemorrhage left eye, right eye conjunctiva and sclera clear, b/l swelling and ecchymosis to both orbital areas L>R  ENMT: Moist mucous membranes, Good dentition, no thrush  NECK: Supple, No JVD  CHEST/LUNG: Clear to auscultation bilaterally, good air entry, non-labored breathing  HEART: RRR; S1/S2, + murmur  ABDOMEN: Soft, Nontender, Nondistended; Bowel sounds present  VASCULAR: Normal pulses, Normal capillary refill  EXTREMITIES: Right LE weakness, No calf tenderness, No cyanosis, No edema  LYMPH: Normal; No lymphadenopathy noted  SKIN: Warm, Intact  PSYCH: Normal mood, Normal affect  NERVOUS SYSTEM:  A/O x3, Good concentration; CN 2-12 intact, No focal deficits

## 2023-11-26 NOTE — ED ADULT TRIAGE NOTE - CHIEF COMPLAINT QUOTE
Patient BIBEMS c/o headache, left eye pain and generalized weakness and leg pains. Patient endorses mechanical fall x 2 days ago with head strike and extensive bilateral periorbital bruising. Patient endorses going to the ED for CT and was sent home on ibuprofen.

## 2023-11-26 NOTE — H&P ADULT - HISTORY OF PRESENT ILLNESS
63y F On Plavix for stroke and ACS presents to the ER for left eye swelling.  Patient was seen in the ER 2 days ago for a mechanical fall suffered bruising to the left eye left forehead area.  Had CT imaging of the head neck and face with did not show evidence of any acute fracture.  Patient was discharged home however states that the swelling has worsened still endorsing pain over the left side of the face and generalized weakness.  Patient taking Tylenol as needed for pain   no worsening headache, vomiting, numbness, blurry vision, neck pain  63y F On Plavix for stroke and ACS, HTN,  presents to the ER for left eye swelling.  Patient was seen in the ER 2 days ago for a mechanical fall suffered bruising to the left eye left forehead area.  Had CT imaging of the head neck and face with did not show evidence of any acute fracture.  Patient was discharged home however states that the swelling has worsened still endorsing pain over the left side of the face and generalized weakness.  Patient taking Tylenol and Aleve with no relief of pain.    no worsening headache, vomiting, numbness, blurry vision, neck pain    [phone]      63y F On Plavix for stroke (right sided residual weakness) and ACS, HTN, HLD presents to the ER for left eye swelling.  Patient was seen in the ER 2 days ago for a mechanical fall suffered bruising to the left eye left forehead area.  Had CT imaging of the head neck and face with did not show evidence of any acute fracture.  Patient was discharged home however states that the swelling has worsened still endorsing 8/10 pain over the left side of the face, pain to both legs and generalized weakness.  Pain unrelieved by Tylenol and Aleve. States she feels uncomfortable and can't sleep. No additional complaints.

## 2023-11-26 NOTE — H&P ADULT - NS ATTEND AMEND GEN_ALL_CORE FT
64y/o F with PMH CVA with residual right sided weakness, on plavix, CAD, HTN, HLD presents to the ER for left eye swelling, difficulty ambulating, anxiety regarding dispo home. Admitted for ambulatory dysfunction. Pain management, bowel regimen. PT michaela in AM.

## 2023-11-26 NOTE — PATIENT PROFILE ADULT - NSPROPTRIGHTCAREGIVER_GEN_A_NUR
Creston [id number] (CVP)         no

## 2023-11-26 NOTE — ED PROVIDER NOTE - PROGRESS NOTE DETAILS
Pt well appearing  Patient understands and agrees with the plan of discharge and to follow up with optho and pmd  Return precautions discussed.  Patient verbalized full understanding.   Patient comfortable going home.  Strict return precautions to the ER for any worsening eye pain or any new concerning sxs to come back to er

## 2023-11-26 NOTE — ED PROVIDER NOTE - CARE PLAN
1 Principal Discharge DX:	Left facial pain   Principal Discharge DX:	Left facial pain  Secondary Diagnosis:	Left leg pain

## 2023-11-26 NOTE — H&P ADULT - NSHPLABSRESULTS_GEN_ALL_CORE
.                            11.2   7.80  )-----------( 237      ( 26 Nov 2023 16:10 )             35.6       11-26    140  |  106  |  17  ----------------------------<  92  3.8   |  28  |  0.61    Ca    8.7      26 Nov 2023 16:10    TPro  7.2  /  Alb  3.5  /  TBili  1.0  /  DBili  x   /  AST  21  /  ALT  26  /  AlkPhos  88  11-26                    Urinalysis Basic - ( 26 Nov 2023 16:10 )    Color: x / Appearance: x / SG: x / pH: x  Gluc: 92 mg/dL / Ketone: x  / Bili: x / Urobili: x   Blood: x / Protein: x / Nitrite: x   Leuk Esterase: x / RBC: x / WBC x   Sq Epi: x / Non Sq Epi: x / Bacteria: x

## 2023-11-26 NOTE — PATIENT PROFILE ADULT - FALL HARM RISK - HARM RISK INTERVENTIONS

## 2023-11-26 NOTE — ED PROVIDER NOTE - PHYSICAL EXAMINATION
Vital signs reviewed  GENERAL: Patient nontoxic appearing, NAD  HEAD: NCAT  EYES:  significant left periorbital and eyelid ecchymosis and soft tissue swelling with subconjunctival hemorrhage.  Ecchymosis over the left forehead with tenderness to palpation.  Pupils equal round and reactive to light extraocular movements are intact bilaterally.  ENT: MMM  No septal hematoma  NECK: Supple, non tender  RESPIRATORY: Normal respiratory effort. CTA B/L. No wheezing, rales, rhonchi  CARDIOVASCULAR: Regular rate and rhythm  ABDOMEN: Soft. Nondistended. Nontender. No guarding or rebound. No CVA tenderness.  MUSCULOSKELETAL/EXTREMITIES: Brisk cap refill. 2+ radial pulses.    mild left proximal tibial tenderness to palpation with ecchymosis.    Normal range of motion of the knee and ankle.  SKIN:  Warm and dry  NEURO: AAOx3. No gross FND.  PSYCHIATRIC: Cooperative. Affect appropriate.

## 2023-11-26 NOTE — H&P ADULT - ASSESSMENT
63y F On Plavix for stroke (right sided residual weakness) and ACS, HTN, HLD, anxiety presents to the ER for left eye swelling.  Patient was seen in the ER 2 days ago for a mechanical fall suffered bruising to the left eye left forehead area.  Had CT imaging of the head neck and face with did not show evidence of any acute fracture.  Patient was discharged home however states that the swelling has worsened, endorsing 8/10 pain over the left side of the face, pain to both legs and generalized weakness.     #Pain s/p fall  #Weakness s/p fall   -Observation  -pain control   -flexeril   -CT's 2 days ago negative for fx/hemorrhage   -PT    #CVA  -Plavix    #HTN  -metoprolol, lisinopril     #HLD  -statin    #Anxiety  -Valium     GOC: DNR    DVT ppx  -ambulate     Plans to update family

## 2023-11-26 NOTE — ED ADULT NURSE NOTE - OBJECTIVE STATEMENT
Pt came from home with c/o worsening left sided facial pain / swelling and generalized weakness. Pt with hx CVA (takes plavix). Reports being seen in the ER 2 days ago s/p mechanical trip and fall with brusing and swelling to the b/l eyes and forehead. Pt states that shes tried taking tylenol at home to help with the pain, with no relief. Pt denies changes in vision, worsening headaches or numbness/tingling.

## 2023-11-26 NOTE — ED PROVIDER NOTE - PATIENT PORTAL LINK FT
You can access the FollowMyHealth Patient Portal offered by [facility] by registering at the following website: [email or web address]. By joining [employer] [facility]’s FollowMyHealth portal, you will also be able to view your health information using other applications (apps) compatible with our system.

## 2023-11-26 NOTE — ED ADULT NURSE NOTE - NSFALLHARMRISKINTERV_ED_ALL_ED

## 2023-11-26 NOTE — ED PROVIDER NOTE - OBJECTIVE STATEMENT
63y F On Plavix for stroke and ACS presents to the ER for left eye swelling.  Patient was seen in the ER 2 days ago for a mechanical fall suffered bruising to the left eye left forehead area.  Had CT imaging of the head neck and face with did not show evidence of any acute fracture.  Patient was discharged home however states that the swelling has worsened still endorsing pain over the left side of the face and generalized weakness.  Patient taking Tylenol as needed for pain   no worsening headache, vomiting, numbness, blurry vision, neck pain

## 2023-11-26 NOTE — ED PROVIDER NOTE - CLINICAL SUMMARY MEDICAL DECISION MAKING FREE TEXT BOX
63 presents to the ER for left  facial swelling.    Hemodynamically stable has significant bruising over the left side of the face and around the left eye.    There is subconjunctival hemorrhage with  no loss of extraocular movements, PERRLA.   negative ct imaging 2 days ago. likely worsening bruising due to coagulopathy  will defer imaging given no new trauma, injury In process of healing

## 2023-11-27 ENCOUNTER — TRANSCRIPTION ENCOUNTER (OUTPATIENT)
Age: 63
End: 2023-11-27

## 2023-11-27 LAB
ALBUMIN SERPL ELPH-MCNC: 3.2 G/DL — LOW (ref 3.3–5)
ALBUMIN SERPL ELPH-MCNC: 3.2 G/DL — LOW (ref 3.3–5)
ALP SERPL-CCNC: 81 U/L — SIGNIFICANT CHANGE UP (ref 40–120)
ALP SERPL-CCNC: 81 U/L — SIGNIFICANT CHANGE UP (ref 40–120)
ALT FLD-CCNC: 23 U/L — SIGNIFICANT CHANGE UP (ref 10–45)
ALT FLD-CCNC: 23 U/L — SIGNIFICANT CHANGE UP (ref 10–45)
ANION GAP SERPL CALC-SCNC: 10 MMOL/L — SIGNIFICANT CHANGE UP (ref 5–17)
ANION GAP SERPL CALC-SCNC: 10 MMOL/L — SIGNIFICANT CHANGE UP (ref 5–17)
AST SERPL-CCNC: 21 U/L — SIGNIFICANT CHANGE UP (ref 10–40)
AST SERPL-CCNC: 21 U/L — SIGNIFICANT CHANGE UP (ref 10–40)
BILIRUB SERPL-MCNC: 1.1 MG/DL — SIGNIFICANT CHANGE UP (ref 0.2–1.2)
BILIRUB SERPL-MCNC: 1.1 MG/DL — SIGNIFICANT CHANGE UP (ref 0.2–1.2)
BUN SERPL-MCNC: 23 MG/DL — SIGNIFICANT CHANGE UP (ref 7–23)
BUN SERPL-MCNC: 23 MG/DL — SIGNIFICANT CHANGE UP (ref 7–23)
CALCIUM SERPL-MCNC: 8.5 MG/DL — SIGNIFICANT CHANGE UP (ref 8.4–10.5)
CALCIUM SERPL-MCNC: 8.5 MG/DL — SIGNIFICANT CHANGE UP (ref 8.4–10.5)
CHLORIDE SERPL-SCNC: 105 MMOL/L — SIGNIFICANT CHANGE UP (ref 96–108)
CHLORIDE SERPL-SCNC: 105 MMOL/L — SIGNIFICANT CHANGE UP (ref 96–108)
CO2 SERPL-SCNC: 24 MMOL/L — SIGNIFICANT CHANGE UP (ref 22–31)
CO2 SERPL-SCNC: 24 MMOL/L — SIGNIFICANT CHANGE UP (ref 22–31)
CREAT SERPL-MCNC: 0.9 MG/DL — SIGNIFICANT CHANGE UP (ref 0.5–1.3)
CREAT SERPL-MCNC: 0.9 MG/DL — SIGNIFICANT CHANGE UP (ref 0.5–1.3)
EGFR: 73 ML/MIN/1.73M2 — SIGNIFICANT CHANGE UP
EGFR: 73 ML/MIN/1.73M2 — SIGNIFICANT CHANGE UP
GLUCOSE SERPL-MCNC: 95 MG/DL — SIGNIFICANT CHANGE UP (ref 70–99)
GLUCOSE SERPL-MCNC: 95 MG/DL — SIGNIFICANT CHANGE UP (ref 70–99)
HCV AB S/CO SERPL IA: 0.07 S/CO — SIGNIFICANT CHANGE UP (ref 0–0.99)
HCV AB S/CO SERPL IA: 0.07 S/CO — SIGNIFICANT CHANGE UP (ref 0–0.99)
HCV AB SERPL-IMP: SIGNIFICANT CHANGE UP
HCV AB SERPL-IMP: SIGNIFICANT CHANGE UP
POTASSIUM SERPL-MCNC: 4.5 MMOL/L — SIGNIFICANT CHANGE UP (ref 3.5–5.3)
POTASSIUM SERPL-MCNC: 4.5 MMOL/L — SIGNIFICANT CHANGE UP (ref 3.5–5.3)
POTASSIUM SERPL-SCNC: 4.5 MMOL/L — SIGNIFICANT CHANGE UP (ref 3.5–5.3)
POTASSIUM SERPL-SCNC: 4.5 MMOL/L — SIGNIFICANT CHANGE UP (ref 3.5–5.3)
PROT SERPL-MCNC: 6.6 G/DL — SIGNIFICANT CHANGE UP (ref 6–8.3)
PROT SERPL-MCNC: 6.6 G/DL — SIGNIFICANT CHANGE UP (ref 6–8.3)
SODIUM SERPL-SCNC: 139 MMOL/L — SIGNIFICANT CHANGE UP (ref 135–145)
SODIUM SERPL-SCNC: 139 MMOL/L — SIGNIFICANT CHANGE UP (ref 135–145)

## 2023-11-27 RX ORDER — PREDNISOLONE SODIUM PHOSPHATE 1 %
2 DROPS OPHTHALMIC (EYE)
Refills: 0 | Status: DISCONTINUED | OUTPATIENT
Start: 2023-11-27 | End: 2023-12-01

## 2023-11-27 RX ORDER — OFLOXACIN 0.3 %
1 DROPS OPHTHALMIC (EYE) EVERY 4 HOURS
Refills: 0 | Status: COMPLETED | OUTPATIENT
Start: 2023-11-27 | End: 2023-11-29

## 2023-11-27 RX ORDER — OFLOXACIN 0.3 %
1 DROPS OPHTHALMIC (EYE)
Refills: 0 | Status: DISCONTINUED | OUTPATIENT
Start: 2023-11-29 | End: 2023-12-01

## 2023-11-27 RX ADMIN — Medication 650 MILLIGRAM(S): at 18:03

## 2023-11-27 RX ADMIN — Medication 650 MILLIGRAM(S): at 00:44

## 2023-11-27 RX ADMIN — LISINOPRIL 5 MILLIGRAM(S): 2.5 TABLET ORAL at 06:10

## 2023-11-27 RX ADMIN — Medication 650 MILLIGRAM(S): at 11:09

## 2023-11-27 RX ADMIN — Medication 650 MILLIGRAM(S): at 12:00

## 2023-11-27 RX ADMIN — CHOLESTYRAMINE 4 GRAM(S): 4 POWDER, FOR SUSPENSION ORAL at 17:27

## 2023-11-27 RX ADMIN — Medication 100 MILLIGRAM(S): at 06:09

## 2023-11-27 RX ADMIN — OXYCODONE HYDROCHLORIDE 5 MILLIGRAM(S): 5 TABLET ORAL at 09:00

## 2023-11-27 RX ADMIN — Medication 10 MILLIGRAM(S): at 06:11

## 2023-11-27 RX ADMIN — Medication 650 MILLIGRAM(S): at 07:05

## 2023-11-27 RX ADMIN — Medication 650 MILLIGRAM(S): at 06:09

## 2023-11-27 RX ADMIN — CHOLESTYRAMINE 4 GRAM(S): 4 POWDER, FOR SUSPENSION ORAL at 00:14

## 2023-11-27 RX ADMIN — Medication 1 DROP(S): at 13:56

## 2023-11-27 RX ADMIN — MORPHINE SULFATE 4 MILLIGRAM(S): 50 CAPSULE, EXTENDED RELEASE ORAL at 15:51

## 2023-11-27 RX ADMIN — Medication 1 DROP(S): at 21:02

## 2023-11-27 RX ADMIN — MORPHINE SULFATE 4 MILLIGRAM(S): 50 CAPSULE, EXTENDED RELEASE ORAL at 16:06

## 2023-11-27 RX ADMIN — Medication 1 DROP(S): at 17:27

## 2023-11-27 RX ADMIN — OXYCODONE HYDROCHLORIDE 5 MILLIGRAM(S): 5 TABLET ORAL at 08:09

## 2023-11-27 RX ADMIN — CHOLESTYRAMINE 4 GRAM(S): 4 POWDER, FOR SUSPENSION ORAL at 11:08

## 2023-11-27 RX ADMIN — ONDANSETRON 4 MILLIGRAM(S): 8 TABLET, FILM COATED ORAL at 13:36

## 2023-11-27 RX ADMIN — Medication 10 MILLIGRAM(S): at 21:01

## 2023-11-27 RX ADMIN — Medication 2 DROP(S): at 17:27

## 2023-11-27 RX ADMIN — CHOLESTYRAMINE 4 GRAM(S): 4 POWDER, FOR SUSPENSION ORAL at 06:14

## 2023-11-27 RX ADMIN — ATORVASTATIN CALCIUM 40 MILLIGRAM(S): 80 TABLET, FILM COATED ORAL at 21:02

## 2023-11-27 RX ADMIN — Medication 650 MILLIGRAM(S): at 00:14

## 2023-11-27 RX ADMIN — CLOPIDOGREL BISULFATE 75 MILLIGRAM(S): 75 TABLET, FILM COATED ORAL at 11:08

## 2023-11-27 RX ADMIN — LORATADINE 10 MILLIGRAM(S): 10 TABLET ORAL at 11:08

## 2023-11-27 RX ADMIN — Medication 650 MILLIGRAM(S): at 17:27

## 2023-11-27 NOTE — DISCHARGE NOTE NURSING/CASE MANAGEMENT/SOCIAL WORK - NSDCPEFALRISK_GEN_ALL_CORE
For information on Fall & Injury Prevention, visit: [email or web address] OR  [email or web address] OR  https://www.cdc.gov/steadi/patient.html

## 2023-11-27 NOTE — PROGRESS NOTE ADULT - ASSESSMENT
63y F On Plavix for stroke (right sided residual weakness) and ACS, HTN, HLD, anxiety presents to the ER for left eye swelling.  Patient was seen in the ER 2 days ago for a mechanical fall suffered bruising to the left eye left forehead area.  Had CT imaging of the head neck and face with did not show evidence of any acute fracture.  Patient was discharged home however states that the swelling has worsened, endorsing 8/10 pain over the left side of the face, pain to both legs and generalized weakness.     LT eye Periorbital Edema  LT eye Trauma  Gait Instability  pt admitted to observation with LT eye trauma status post fall   CT head negative, Ct Maxillofacial with left scalp hematoma, no facial or orbital fx, CT C/S negative  Started ofloxacin today, Called Dr. Forte Opthalmology consult  Pain management, flexeril  PT evaluation appreciated, pt agreeable to [facility]    CVA  continue plavix    HTN  continue metoprolol, lisinopril    HLD  continue statin therapy    Anxiety  continue valium    DVT PPx  encourage ambulation    Goals of Care  DNR    plans to update family

## 2023-11-27 NOTE — DISCHARGE NOTE NURSING/CASE MANAGEMENT/SOCIAL WORK - NSDCFUADDAPPT_GEN_ALL_CORE_FT
Follow up appt with YAN Rubio at Dr. Martinez's office [phone]	 on 11/30/2023 @ 12:15pm	  D/c to [facility] with Dr Melgar as pcp [phone] 8th floor

## 2023-11-27 NOTE — PHYSICAL THERAPY INITIAL EVALUATION ADULT - ADDITIONAL COMMENTS
Pt lives alone in a house (? being evicted) in the process of moving. 2 steps to enter "I crawl up the steps" . Pt ambulates with SAC, has rollator however does not use as it has not been adjusted. ? Hoarding, pt reports having difficulty getting around in her house and being unable to clean up after herself. Pt also reports frequent falls that she does not report to anyone.   Pt was attending [facility] out-pt PT in [place] 1 x week

## 2023-11-27 NOTE — DISCHARGE NOTE NURSING/CASE MANAGEMENT/SOCIAL WORK - NSDCPEPTSTRK_GEN_ALL_CORE
HPI:   Ry Lam is a 63year old female who presents for a complete physical exam.     Wt Readings from Last 6 Encounters:  05/15/19 : 102 lb  05/01/19 : 103 lb  02/01/18 : 102 lb  10/31/17 : 101 lb  05/25/17 : 102 lb 11.2 oz  02/21/17 : 102 lb Outpatient Medications:  finasteride 5 MG Oral Tab TAKE 2.5 MG TO 5MG BY MOUTH ONCE DAILY AS DIRECTED BY YOUR PHYSICIAN Disp: 90 tablet Rfl: 3   ALPRAZolam 0.5 MG Oral Tab Take 1 tablet (0.5 mg total) by mouth as needed for Sleep or Anxiety (As needed when Cancer Mother 80   • Other (osteoporosis) Mother    • Heart Attack Other         mat uncle  age 39      Social History:   Social History    Tobacco Use      Smoking status: Never Smoker      Smokeless tobacco: Never Used    Alcohol use:  Yes      Al exam    - BASIC METABOLIC PANEL (8); Future  - [place] IGG,IGM,IGA; Future    2. Encounter for screening mammogram for high-risk patient    - [facility] VISHNU 2D+3D SCREENING BILAT (CPT=77067/38963); Future    3.  Screening for osteoporosis    - XR DEXA BONE DENSIT Call 911 for stroke/Need for follow up after discharge/Prescribed medications/Risk factors for stroke/Stroke education booklet/Stroke support groups for patients, families, and friends/Stroke warning signs and symptoms/Signs and symptoms of stroke

## 2023-11-27 NOTE — PHYSICAL THERAPY INITIAL EVALUATION ADULT - PERTINENT HX OF CURRENT PROBLEM, REHAB EVAL
63y F On Plavix for stroke (right sided residual weakness) and ACS, HTN, HLD presents to the ER for left eye swelling.  Patient was seen in the ER 2 days ago for a mechanical fall suffered bruising to the left eye left forehead area.  Had CT imaging of the head neck and face with did not show evidence of any acute fracture.  Patient was discharged home however states that the swelling has worsened still endorsing 8/10 pain over the left side of the face, pain to both legs and generalized weakness.  Pain unrelieved by Tylenol and Aleve. States she feels uncomfortable and can't sleep. No additional complaints.

## 2023-11-27 NOTE — DISCHARGE NOTE NURSING/CASE MANAGEMENT/SOCIAL WORK - PATIENT PORTAL LINK FT
You can access the FollowMyHealth Patient Portal offered by [facility] by registering at the following website: [email or web address]. By joining [employer]’s FollowMyHealth portal, you will also be able to view your health information using other applications (apps) compatible with our system.

## 2023-11-27 NOTE — DISCHARGE NOTE NURSING/CASE MANAGEMENT/SOCIAL WORK - NSDCVIVACCINE_GEN_ALL_CORE_FT
Tdap; 24-Nov-2023 21:56; Kareem Bo (DIONISIO); [employer]; [id number] (Exp. Date: 20-Jul-2025); IntraMuscular; Deltoid Right.; 0.5 milliLiter(s); VIS (VIS Published: 09-May-2013, VIS Presented: 24-Nov-2023);

## 2023-11-27 NOTE — PHYSICAL THERAPY INITIAL EVALUATION ADULT - GAIT TRAINING, PT EVAL
In 1-3 therapy sessions pt will be able to ambulate 50 ft with contact guard and with appropriate assistive device.

## 2023-11-27 NOTE — PHYSICAL THERAPY INITIAL EVALUATION ADULT - MD/RN NOTIFIED
Pt notified
Referral placed
The pt was here to see you 03/30 for sciatica pain and was given an injection of decadron and a script for prednisone and flexeril, she is calling because the injection lasted a little over a week and she is back in pain and is asking what the next step is, she says she thought you said something about referring her out to someone
yes

## 2023-11-27 NOTE — PROGRESS NOTE ADULT - SUBJECTIVE AND OBJECTIVE BOX
Patient is a 63y old  Female who presents with a chief complaint of Pain (26 Nov 2023 16:34)    Patient seen and examined at bedside.  no acute overnight events    ALLERGIES:  Benadryl (Unknown)  Ambien (Unknown)  promazine (Unknown)        Vital Signs Last 24 Hrs  T(F): 98 (27 Nov 2023 04:55), Max: 98 (27 Nov 2023 04:55)  HR: 60 (27 Nov 2023 04:55) (55 - 76)  BP: 160/89 (27 Nov 2023 04:55) (146/76 - 164/81)  RR: 18 (27 Nov 2023 04:55) (17 - 20)  SpO2: 98% (27 Nov 2023 04:55) (97% - 100%)  I&O's Summary    27 Nov 2023 07:01  -  27 Nov 2023 11:49  --------------------------------------------------------  IN: 420 mL / OUT: 0 mL / NET: 420 mL      MEDICATIONS:  acetaminophen     Tablet .. 650 milliGRAM(s) Oral every 6 hours  aluminum hydroxide/magnesium hydroxide/simethicone Suspension 30 milliLiter(s) Oral every 4 hours PRN  atorvastatin 40 milliGRAM(s) Oral at bedtime  cholestyramine Powder (Sugar-Free) 4 Gram(s) Oral four times a day  clopidogrel Tablet 75 milliGRAM(s) Oral daily  diazepam    Tablet 10 milliGRAM(s) Oral three times a day PRN  lisinopril 5 milliGRAM(s) Oral daily  loratadine 10 milliGRAM(s) Oral daily  melatonin 3 milliGRAM(s) Oral at bedtime PRN  metoprolol tartrate 100 milliGRAM(s) Oral two times a day  morphine  - Injectable 4 milliGRAM(s) IV Push every 4 hours PRN  ofloxacin 0.3% Solution 1 Drop(s) Left EYE every 4 hours  ondansetron Injectable 4 milliGRAM(s) IV Push every 8 hours PRN  oxyCODONE    IR 5 milliGRAM(s) Oral every 4 hours PRN      PHYSICAL EXAM:  General: NAD, A/O x 3  HEENT: MMM, no thrush, Left facial bruising, LT eye periorbital swelling, erythema, bruising  Neck: Supple, No JVD  Lungs: Clear to auscultation bilaterally, bilateral air entry, non labored  Cardio: RRR, S1/S2, No murmurs  Abdomen: Soft, Nontender, Nondistended; Bowel sounds present  Extremities: No cyanosis, No edema    LABS:                        11.2   7.80  )-----------( 237      ( 26 Nov 2023 16:10 )             35.6     11-27    139  |  105  |  23  ----------------------------<  95  4.5   |  24  |  0.90    Ca    8.5      27 Nov 2023 06:34    TPro  6.6  /  Alb  3.2  /  TBili  1.1  /  DBili  x   /  AST  21  /  ALT  23  /  AlkPhos  81  11-27                                Urinalysis Basic - ( 27 Nov 2023 06:34 )    Color: x / Appearance: x / SG: x / pH: x  Gluc: 95 mg/dL / Ketone: x  / Bili: x / Urobili: x   Blood: x / Protein: x / Nitrite: x   Leuk Esterase: x / RBC: x / WBC x   Sq Epi: x / Non Sq Epi: x / Bacteria: x            RADIOLOGY & ADDITIONAL TESTS:    Care Discussed with Consultants/Other Providers:

## 2023-11-28 ENCOUNTER — TRANSCRIPTION ENCOUNTER (OUTPATIENT)
Age: 63
End: 2023-11-28

## 2023-11-28 DIAGNOSIS — R51.9 HEADACHE, UNSPECIFIED: ICD-10-CM

## 2023-11-28 LAB
ANION GAP SERPL CALC-SCNC: 11 MMOL/L — SIGNIFICANT CHANGE UP (ref 5–17)
ANION GAP SERPL CALC-SCNC: 11 MMOL/L — SIGNIFICANT CHANGE UP (ref 5–17)
BUN SERPL-MCNC: 17 MG/DL — SIGNIFICANT CHANGE UP (ref 7–23)
BUN SERPL-MCNC: 17 MG/DL — SIGNIFICANT CHANGE UP (ref 7–23)
CALCIUM SERPL-MCNC: 9.3 MG/DL — SIGNIFICANT CHANGE UP (ref 8.4–10.5)
CALCIUM SERPL-MCNC: 9.3 MG/DL — SIGNIFICANT CHANGE UP (ref 8.4–10.5)
CHLORIDE SERPL-SCNC: 103 MMOL/L — SIGNIFICANT CHANGE UP (ref 96–108)
CHLORIDE SERPL-SCNC: 103 MMOL/L — SIGNIFICANT CHANGE UP (ref 96–108)
CO2 SERPL-SCNC: 23 MMOL/L — SIGNIFICANT CHANGE UP (ref 22–31)
CO2 SERPL-SCNC: 23 MMOL/L — SIGNIFICANT CHANGE UP (ref 22–31)
CREAT SERPL-MCNC: 0.9 MG/DL — SIGNIFICANT CHANGE UP (ref 0.5–1.3)
CREAT SERPL-MCNC: 0.9 MG/DL — SIGNIFICANT CHANGE UP (ref 0.5–1.3)
EGFR: 73 ML/MIN/1.73M2 — SIGNIFICANT CHANGE UP
EGFR: 73 ML/MIN/1.73M2 — SIGNIFICANT CHANGE UP
GLUCOSE SERPL-MCNC: 98 MG/DL — SIGNIFICANT CHANGE UP (ref 70–99)
GLUCOSE SERPL-MCNC: 98 MG/DL — SIGNIFICANT CHANGE UP (ref 70–99)
HCT VFR BLD CALC: 36 % — SIGNIFICANT CHANGE UP (ref 34.5–45)
HCT VFR BLD CALC: 36 % — SIGNIFICANT CHANGE UP (ref 34.5–45)
HGB BLD-MCNC: 11.4 G/DL — LOW (ref 11.5–15.5)
HGB BLD-MCNC: 11.4 G/DL — LOW (ref 11.5–15.5)
MCHC RBC-ENTMCNC: 27.3 PG — SIGNIFICANT CHANGE UP (ref 27–34)
MCHC RBC-ENTMCNC: 27.3 PG — SIGNIFICANT CHANGE UP (ref 27–34)
MCHC RBC-ENTMCNC: 31.7 GM/DL — LOW (ref 32–36)
MCHC RBC-ENTMCNC: 31.7 GM/DL — LOW (ref 32–36)
MCV RBC AUTO: 86.3 FL — SIGNIFICANT CHANGE UP (ref 80–100)
MCV RBC AUTO: 86.3 FL — SIGNIFICANT CHANGE UP (ref 80–100)
NRBC # BLD: 0 /100 WBCS — SIGNIFICANT CHANGE UP (ref 0–0)
NRBC # BLD: 0 /100 WBCS — SIGNIFICANT CHANGE UP (ref 0–0)
PLATELET # BLD AUTO: 214 K/UL — SIGNIFICANT CHANGE UP (ref 150–400)
PLATELET # BLD AUTO: 214 K/UL — SIGNIFICANT CHANGE UP (ref 150–400)
POTASSIUM SERPL-MCNC: 4.3 MMOL/L — SIGNIFICANT CHANGE UP (ref 3.5–5.3)
POTASSIUM SERPL-MCNC: 4.3 MMOL/L — SIGNIFICANT CHANGE UP (ref 3.5–5.3)
POTASSIUM SERPL-SCNC: 4.3 MMOL/L — SIGNIFICANT CHANGE UP (ref 3.5–5.3)
POTASSIUM SERPL-SCNC: 4.3 MMOL/L — SIGNIFICANT CHANGE UP (ref 3.5–5.3)
RBC # BLD: 4.17 M/UL — SIGNIFICANT CHANGE UP (ref 3.8–5.2)
RBC # BLD: 4.17 M/UL — SIGNIFICANT CHANGE UP (ref 3.8–5.2)
RBC # FLD: 15.4 % — HIGH (ref 10.3–14.5)
RBC # FLD: 15.4 % — HIGH (ref 10.3–14.5)
SODIUM SERPL-SCNC: 137 MMOL/L — SIGNIFICANT CHANGE UP (ref 135–145)
SODIUM SERPL-SCNC: 137 MMOL/L — SIGNIFICANT CHANGE UP (ref 135–145)
WBC # BLD: 6.93 K/UL — SIGNIFICANT CHANGE UP (ref 3.8–10.5)
WBC # BLD: 6.93 K/UL — SIGNIFICANT CHANGE UP (ref 3.8–10.5)
WBC # FLD AUTO: 6.93 K/UL — SIGNIFICANT CHANGE UP (ref 3.8–10.5)
WBC # FLD AUTO: 6.93 K/UL — SIGNIFICANT CHANGE UP (ref 3.8–10.5)

## 2023-11-28 PROCEDURE — 99232 SBSQ HOSP IP/OBS MODERATE 35: CPT

## 2023-11-28 RX ORDER — CLOPIDOGREL BISULFATE 75 MG/1
1 TABLET, FILM COATED ORAL
Refills: 0 | DISCHARGE

## 2023-11-28 RX ORDER — ROSUVASTATIN CALCIUM 5 MG/1
1 TABLET ORAL
Refills: 0 | DISCHARGE

## 2023-11-28 RX ORDER — CHOLESTYRAMINE 4 G/9G
4 POWDER, FOR SUSPENSION ORAL
Refills: 0 | DISCHARGE

## 2023-11-28 RX ORDER — LORATADINE 10 MG/1
1 TABLET ORAL
Refills: 0 | DISCHARGE

## 2023-11-28 RX ORDER — DIAZEPAM 5 MG
1 TABLET ORAL
Refills: 0 | DISCHARGE

## 2023-11-28 RX ADMIN — Medication 1 DROP(S): at 17:28

## 2023-11-28 RX ADMIN — Medication 650 MILLIGRAM(S): at 05:03

## 2023-11-28 RX ADMIN — Medication 1 DROP(S): at 15:30

## 2023-11-28 RX ADMIN — CLOPIDOGREL BISULFATE 75 MILLIGRAM(S): 75 TABLET, FILM COATED ORAL at 12:41

## 2023-11-28 RX ADMIN — Medication 650 MILLIGRAM(S): at 00:58

## 2023-11-28 RX ADMIN — OXYCODONE HYDROCHLORIDE 5 MILLIGRAM(S): 5 TABLET ORAL at 05:29

## 2023-11-28 RX ADMIN — Medication 1 DROP(S): at 21:22

## 2023-11-28 RX ADMIN — CHOLESTYRAMINE 4 GRAM(S): 4 POWDER, FOR SUSPENSION ORAL at 12:42

## 2023-11-28 RX ADMIN — OXYCODONE HYDROCHLORIDE 5 MILLIGRAM(S): 5 TABLET ORAL at 04:45

## 2023-11-28 RX ADMIN — Medication 1 DROP(S): at 12:41

## 2023-11-28 RX ADMIN — Medication 650 MILLIGRAM(S): at 17:29

## 2023-11-28 RX ADMIN — Medication 650 MILLIGRAM(S): at 12:42

## 2023-11-28 RX ADMIN — Medication 650 MILLIGRAM(S): at 17:28

## 2023-11-28 RX ADMIN — Medication 100 MILLIGRAM(S): at 17:29

## 2023-11-28 RX ADMIN — Medication 10 MILLIGRAM(S): at 08:22

## 2023-11-28 RX ADMIN — OXYCODONE HYDROCHLORIDE 5 MILLIGRAM(S): 5 TABLET ORAL at 21:18

## 2023-11-28 RX ADMIN — LISINOPRIL 5 MILLIGRAM(S): 2.5 TABLET ORAL at 05:03

## 2023-11-28 RX ADMIN — Medication 1 DROP(S): at 03:00

## 2023-11-28 RX ADMIN — Medication 650 MILLIGRAM(S): at 06:00

## 2023-11-28 RX ADMIN — Medication 650 MILLIGRAM(S): at 00:01

## 2023-11-28 RX ADMIN — OXYCODONE HYDROCHLORIDE 5 MILLIGRAM(S): 5 TABLET ORAL at 20:21

## 2023-11-28 RX ADMIN — ATORVASTATIN CALCIUM 40 MILLIGRAM(S): 80 TABLET, FILM COATED ORAL at 21:21

## 2023-11-28 RX ADMIN — Medication 2 DROP(S): at 00:03

## 2023-11-28 RX ADMIN — Medication 2 DROP(S): at 05:03

## 2023-11-28 RX ADMIN — Medication 1 DROP(S): at 05:04

## 2023-11-28 RX ADMIN — CHOLESTYRAMINE 4 GRAM(S): 4 POWDER, FOR SUSPENSION ORAL at 17:28

## 2023-11-28 RX ADMIN — Medication 2 DROP(S): at 12:41

## 2023-11-28 RX ADMIN — OXYCODONE HYDROCHLORIDE 5 MILLIGRAM(S): 5 TABLET ORAL at 00:58

## 2023-11-28 RX ADMIN — OXYCODONE HYDROCHLORIDE 5 MILLIGRAM(S): 5 TABLET ORAL at 13:56

## 2023-11-28 RX ADMIN — CHOLESTYRAMINE 4 GRAM(S): 4 POWDER, FOR SUSPENSION ORAL at 05:05

## 2023-11-28 RX ADMIN — CHOLESTYRAMINE 4 GRAM(S): 4 POWDER, FOR SUSPENSION ORAL at 00:00

## 2023-11-28 RX ADMIN — OXYCODONE HYDROCHLORIDE 5 MILLIGRAM(S): 5 TABLET ORAL at 00:00

## 2023-11-28 RX ADMIN — LORATADINE 10 MILLIGRAM(S): 10 TABLET ORAL at 12:42

## 2023-11-28 RX ADMIN — Medication 650 MILLIGRAM(S): at 12:41

## 2023-11-28 RX ADMIN — OXYCODONE HYDROCHLORIDE 5 MILLIGRAM(S): 5 TABLET ORAL at 14:26

## 2023-11-28 RX ADMIN — Medication 2 DROP(S): at 17:28

## 2023-11-28 NOTE — DISCHARGE NOTE PROVIDER - ATTENDING DISCHARGE PHYSICAL EXAMINATION:
PHYSICAL EXAM:  GENERAL: NAD  HEENT: NCAT, left forehead hematoma, resolving infraorbital/left cheek ecchymoses, left eye subconjunctival hemorrhage - resolving   NECK: Supple, No JVD  CHEST/LUNG: Clear to percussion bilaterally; No rales, rhonchi, wheezing  HEART: Regular rate and rhythm; No murmurs  ABDOMEN: Soft, Nontender, Nondistended; Bowel sounds present  MUSCULOSKELETAL/EXTREMITIES:  2+ Peripheral Pulses, No LE edema, left upper shin hematoma -resolving   PSYCH: Appropriate affect  NEURO: AAO x 3, nonfocal

## 2023-11-28 NOTE — DISCHARGE NOTE PROVIDER - NSDCCPCAREPLAN_GEN_ALL_CORE_FT
PRINCIPAL DISCHARGE DIAGNOSIS  Diagnosis: Periorbital edema of left eye  Assessment and Plan of Treatment: an ophthalmologist saw you, imaging done did not reveal any fractures,  stable for outpatient follow up.      SECONDARY DISCHARGE DIAGNOSES  Diagnosis: Gait instability  Assessment and Plan of Treatment: plan for sub acute rehab     PRINCIPAL DISCHARGE DIAGNOSIS  Diagnosis: Periorbital edema of left eye  Assessment and Plan of Treatment: an ophthalmologist saw you, imaging done did not reveal any fractures,  stable for outpatient follow up.      SECONDARY DISCHARGE DIAGNOSES  Diagnosis: Gait instability  Assessment and Plan of Treatment: plan for sub acute rehab    Diagnosis: Symptomatic bradycardia  Assessment and Plan of Treatment: your metoprolol was stopped, a cardiologist saw you and you had an echocardiogram that was reviewed by cardiology.  you are stable for discharge please follow up with your cardiologist.

## 2023-11-28 NOTE — DISCHARGE NOTE PROVIDER - NSDCMRMEDTOKEN_GEN_ALL_CORE_FT
cholestyramine 4 g/8.3 g oral powder for reconstitution: 4 gram(s) orally 4 times a day  diazePAM 10 mg oral tablet: 1 tab(s) orally 3 times a day  lisinopril 2.5 mg oral tablet: 2 tab(s) orally once a day  loratadine 10 mg oral tablet: 1 tab(s) orally once a day  metoprolol tartrate 100 mg oral tablet: 1 tab(s) orally 2 times a day  Plavix 75 mg oral tablet: 1 tab(s) orally once a day  rosuvastatin 10 mg oral tablet: 1 tab(s) orally once a day   cholestyramine 4 g/8.3 g oral powder for reconstitution: 4 gram(s) orally 4 times a day  diazePAM 10 mg oral tablet: 1 tab(s) orally 3 times a day  lisinopril 5 mg oral tablet: 1 tab(s) orally once a day  loratadine 10 mg oral tablet: 1 tab(s) orally once a day  ofloxacin 0.3% ophthalmic solution: 1 drop(s) to each affected eye 4 times a day  Plavix 75 mg oral tablet: 1 tab(s) orally once a day  prednisoLONE acetate 1% ophthalmic suspension: 2 drop(s) to each affected eye 4 times a day  rosuvastatin 10 mg oral tablet: 1 tab(s) orally once a day

## 2023-11-28 NOTE — CONSULT NOTE ADULT - SUBJECTIVE AND OBJECTIVE BOX
Monday 11/27/2023 5pm  Ophthalmology Consult Note    63y F On Plavix for stroke (right sided residual weakness) and ACS, HTN, HLD presents to the ER for left eye swelling.  Patient was seen in the ER 2 days ago for a mechanical fall suffered bruising to the left eye left forehead area.  Had CT imaging of the head neck and face with did not show evidence of any acute fracture.  Patient was discharged home however states that the swelling has worsened still endorsing 8/10 pain over the left side of the face, pain to both legs and generalized weakness.  Pain unrelieved by Tylenol and Aleve. States she feels uncomfortable and can't sleep. No additional complaints.     PAST MEDICAL & SURGICAL HISTORY:  History of heart attack      Stroke        MEDICATIONS  (STANDING):  acetaminophen     Tablet .. 650 milliGRAM(s) Oral every 6 hours  atorvastatin 40 milliGRAM(s) Oral at bedtime  cholestyramine Powder (Sugar-Free) 4 Gram(s) Oral four times a day  clopidogrel Tablet 75 milliGRAM(s) Oral daily  lisinopril 5 milliGRAM(s) Oral daily  loratadine 10 milliGRAM(s) Oral daily  metoprolol tartrate 100 milliGRAM(s) Oral two times a day  ofloxacin 0.3% Solution 1 Drop(s) Left EYE every 4 hours  prednisoLONE acetate 1% Suspension 2 Drop(s) Left EYE four times a day    POH - Denies past ocular hx    Allergies - Benadryl (Unknown)  Ambien (Unknown)  promazine (Unknown)        Vasc near card OD 20/100  OS 20/100;   with +2.50-> OD 20/25  OS 20/25  Pupils - 2/2 PERRLA NO APD  EOMs - Full OU; no entrapment, no enophthalmos  CVF - Full OU    Portable SLE bedside  LLA -  ecchymosis BUL, BLL; hematoma/skin abrasions LT temp forehead  C/S - quiet OD, Subconj heme temp OS  K - Clear OU  A/C - D&Q OU  Iris - Flat OU  IOP - soft ou        Imp/Plan:  1. Soft tissue injury s/p fall, no Fx on CT, no EOM restrictions continue drops until discharge; f/u with private Eye[facility]  2. Refractive error/Presbyopia- needs refraction with private Eye[facility]    Deshawn Forte MD, FACS  [phone] cell Monday 11/27/2023 5pm  Ophthalmology Consult Note    63y F On Plavix for stroke (right sided residual weakness) and ACS, HTN, HLD presents to the ER for left eye swelling.  Patient was seen in the ER 2 days ago for a mechanical fall suffered bruising to the left eye left forehead area.  Had CT imaging of the head neck and face with did not show evidence of any acute fracture.  Patient was discharged home however states that the swelling has worsened still endorsing 8/10 pain over the left side of the face, pain to both legs and generalized weakness.  Pain unrelieved by Tylenol and Aleve. States she feels uncomfortable and can't sleep. No additional complaints.     PAST MEDICAL & SURGICAL HISTORY:  History of heart attack      Stroke        MEDICATIONS  (STANDING):  acetaminophen     Tablet .. 650 milliGRAM(s) Oral every 6 hours  atorvastatin 40 milliGRAM(s) Oral at bedtime  cholestyramine Powder (Sugar-Free) 4 Gram(s) Oral four times a day  clopidogrel Tablet 75 milliGRAM(s) Oral daily  lisinopril 5 milliGRAM(s) Oral daily  loratadine 10 milliGRAM(s) Oral daily  metoprolol tartrate 100 milliGRAM(s) Oral two times a day  ofloxacin 0.3% Solution 1 Drop(s) Left EYE every 4 hours  prednisoLONE acetate 1% Suspension 2 Drop(s) Left EYE four times a day    POH - Denies past ocular hx    Allergies - Benadryl (Unknown)  Ambien (Unknown)  promazine (Unknown)        Vasc near card OD 20/100  OS 20/100;   with +2.50-> OD 20/25  OS 20/25  Pupils - 2/2 PERRLA NO APD  EOMs - Full OU; no entrapment, no enophthalmos  CVF - Full OU    Portable SLE bedside  LLA -  ecchymosis BUL, BLL; hematoma/skin abrasions LT temp forehead  C/S - quiet OD, Subconj heme temp OS  K - Clear OU  A/C - D&Q OU  Iris - Flat OU  IOP - soft ou        Imp/Plan:  1. Soft tissue injury s/p fall, no Fx on CT, no EOM restrictions-> continue drops until discharge; f/u with private EyeMD  2. Refractive error/Presbyopia- needs refraction with private Eye[facility]    Deshawn Forte MD, FACS  [phone] cell

## 2023-11-28 NOTE — PROGRESS NOTE ADULT - SUBJECTIVE AND OBJECTIVE BOX
Patient is a 63y old  Female who presents with a chief complaint of Pain (28 Nov 2023 07:41)    Patient seen and examined at bedside.  no acute overnight events    ALLERGIES:  Benadryl (Unknown)  Ambien (Unknown)  promazine (Unknown)        Vital Signs Last 24 Hrs  T(F): 97.3 (28 Nov 2023 04:56), Max: 97.9 (27 Nov 2023 15:42)  HR: 52 (28 Nov 2023 04:56) (52 - 57)  BP: 156/88 (28 Nov 2023 04:56) (140/72 - 156/88)  RR: 16 (28 Nov 2023 04:56) (16 - 18)  SpO2: 99% (28 Nov 2023 04:56) (97% - 99%)  I&O's Summary    27 Nov 2023 07:01  -  28 Nov 2023 07:00  --------------------------------------------------------  IN: 900 mL / OUT: 0 mL / NET: 900 mL      MEDICATIONS:  acetaminophen     Tablet .. 650 milliGRAM(s) Oral every 6 hours  aluminum hydroxide/magnesium hydroxide/simethicone Suspension 30 milliLiter(s) Oral every 4 hours PRN  atorvastatin 40 milliGRAM(s) Oral at bedtime  cholestyramine Powder (Sugar-Free) 4 Gram(s) Oral four times a day  clopidogrel Tablet 75 milliGRAM(s) Oral daily  diazepam    Tablet 10 milliGRAM(s) Oral three times a day PRN  lisinopril 5 milliGRAM(s) Oral daily  loratadine 10 milliGRAM(s) Oral daily  melatonin 3 milliGRAM(s) Oral at bedtime PRN  metoprolol tartrate 100 milliGRAM(s) Oral two times a day  ofloxacin 0.3% Solution 1 Drop(s) Left EYE every 4 hours  ondansetron Injectable 4 milliGRAM(s) IV Push every 8 hours PRN  oxyCODONE    IR 5 milliGRAM(s) Oral every 4 hours PRN  prednisoLONE acetate 1% Suspension 2 Drop(s) Left EYE four times a day      PHYSICAL EXAM:  General: NAD, A/O x 3  HEENT: MMM, no thrush, LT eye periorbital swelling, erythema and bruising  Neck: Supple, No JVD  Lungs: Clear to auscultation bilaterally, bilateral air entry, non labored  Cardio: RRR, S1/S2, No murmurs  Abdomen: Soft, Nontender, Nondistended; Bowel sounds present  Extremities: No cyanosis, No edema    LABS:                        11.4   6.93  )-----------( 214      ( 28 Nov 2023 07:08 )             36.0     11-28    137  |  103  |  17  ----------------------------<  98  4.3   |  23  |  0.90    Ca    9.3      28 Nov 2023 07:08    TPro  6.6  /  Alb  3.2  /  TBili  1.1  /  DBili  x   /  AST  21  /  ALT  23  /  AlkPhos  81  11-27                                Urinalysis Basic - ( 28 Nov 2023 07:08 )    Color: x / Appearance: x / SG: x / pH: x  Gluc: 98 mg/dL / Ketone: x  / Bili: x / Urobili: x   Blood: x / Protein: x / Nitrite: x   Leuk Esterase: x / RBC: x / WBC x   Sq Epi: x / Non Sq Epi: x / Bacteria: x            RADIOLOGY & ADDITIONAL TESTS:    Care Discussed with Consultants/Other Providers:

## 2023-11-28 NOTE — DISCHARGE NOTE PROVIDER - HOSPITAL COURSE
Hospital Course   63y F On Plavix for stroke (right sided residual weakness) and ACS, HTN, HLD presents to the ER for left eye swelling.  Patient was seen in the ER 2 days ago for a mechanical fall suffered bruising to the left eye left forehead area.  Had CT imaging of the head neck and face with did not show evidence of any acute fracture.  Patient was discharged home however states that the swelling has worsened still endorsing 8/10 pain over the left side of the face, pain to both legs and generalized weakness.  Pain unrelieved by Tylenol and Aleve. States she feels uncomfortable and can't sleep. No additional complaints.    Patient admitted to the hospital on 11/26 with left eye periorbital edema/trauma in setting of fall at home.  CT head was negative, CT MF showed left scalp hematoma, no fx.  Opthalmology saw the patient, started eye drops, recommends outpatient follow up.  Physical therapy saw the patient, recommends [facility], pt is agreeable.  Medically cleared for [facility].     Source of Infection:  Antibiotic / Last Day: N/A    Palliative Care / Advanced Care Planning  Code Status: full code  Patient/Family agreeable to Hospice/Palliative (Y/N)?  Summary of Goals of Care Conversation:    Discharging Provider:  Rodolfo Cano NP  Contact Info: Cell [phone] - Please call with any questions or concerns.    Outpatient Provider: Dr. Martinez           Hospital Course   63y F On Plavix for stroke (right sided residual weakness) and ACS, HTN, HLD presents to the ER for left eye swelling.  Patient was seen in the ER 2 days ago for a mechanical fall suffered bruising to the left eye left forehead area.  Had CT imaging of the head neck and face with did not show evidence of any acute fracture.  Patient was discharged home however states that the swelling has worsened still endorsing 8/10 pain over the left side of the face, pain to both legs and generalized weakness.  Pain unrelieved by Tylenol and Aleve. States she feels uncomfortable and can't sleep. No additional complaints.    Patient admitted to the hospital on 11/26 with left eye periorbital edema/trauma in setting of fall at home.  CT head was negative, CT MF showed left scalp hematoma, no fx.  Opthalmology saw the patient, started eye drops, recommends outpatient follow up.  Physical therapy saw the patient, recommends [facility], pt is agreeable.  Medically cleared for [facility].     During hospital stay pt was also found with symptomatic bradycardia HR 40's.  EKG showed Sinus grant no signs of ACS, Cardiology saw the patient, metoprolol was stopped,  ECHO was done that did not show any wall motion abnormalities, normal EF.  Pt is cardiology and medically stable.       Source of Infection:  Antibiotic / Last Day: N/A    Palliative Care / Advanced Care Planning  Code Status: full code  Patient/Family agreeable to Hospice/Palliative (Y/N)?  Summary of Goals of Care Conversation:    Discharging Provider:  Rodolfo Cano NP  Contact Info: Cell [phone] - Please call with any questions or concerns.    Outpatient Provider: Dr. Martinez

## 2023-11-28 NOTE — DISCHARGE NOTE PROVIDER - NSDCFUSCHEDAPPT_GEN_ALL_CORE_FT
Emiliano Rubio  [facility] Physician [facility] [address]  Scheduled Appointment: 11/30/2023    Meenu Gibbons  [facility] Physician Pending sale to [facility]  PA[address] [facility]  Scheduled Appointment: 12/15/2023    Leo Carmen  [facility] Physician [facility] [address]  Scheduled Appointment: 01/23/2024    Fidel Martinez  [facility] [address]  Scheduled Appointment: 02/06/2024     Meenu Gibbons Physician [facility]  PAINMGT [address]  Scheduled Appointment: 12/15/2023    Leo Carmen  [place] Physician [facility]  GASTRO [address]  Scheduled Appointment: 01/23/2024    Fidel Martinez  [place] Physician [facility]  [address]  Scheduled Appointment: 02/06/2024

## 2023-11-28 NOTE — PROGRESS NOTE ADULT - ASSESSMENT
63y F On Plavix for stroke (right sided residual weakness) and ACS, HTN, HLD, anxiety presents to the ER for left eye swelling.  Patient was seen in the ER 2 days ago for a mechanical fall suffered bruising to the left eye left forehead area.  Had CT imaging of the head neck and face with did not show evidence of any acute fracture.  Patient was discharged home however states that the swelling has worsened, endorsing 8/10 pain over the left side of the face, pain to both legs and generalized weakness.     LT eye Periorbital Edema  LT eye Trauma  Gait Instability  pt admitted to observation with LT eye trauma status post fall   CT head negative, Ct Maxillofacial with left scalp hematoma, no facial or orbital fx, CT C/S negative  Opthalmology consult appreciated, soft tissue injury s/p fall, no fx on CT, cont eye drops   Pain management, flexeril, follow up outpatient  PT evaluation appreciated, pt agreeable to [facility]  Medically cleared [facility] pending auth    CVA  continue plavix    HTN  continue metoprolol, lisinopril    HLD  continue statin therapy    Anxiety  continue valium    DVT PPx  encourage ambulation    Goals of Care  DNR    plans to update family

## 2023-11-28 NOTE — DISCHARGE NOTE PROVIDER - CARE PROVIDER_API CALL
Deshawn Forte  Ophthalmology  [address] [place] [address], [address]  [place] [address]  Phone: [phone]  Fax: [phone]  Follow Up Time:     Fidel Martinez[address] [address]  Phone: [phone]  Fax: [phone]  Follow Up Time:

## 2023-11-29 PROCEDURE — 73590 X-RAY EXAM OF LOWER LEG: CPT | Mod: 26,LT

## 2023-11-29 PROCEDURE — 99232 SBSQ HOSP IP/OBS MODERATE 35: CPT

## 2023-11-29 RX ADMIN — LISINOPRIL 5 MILLIGRAM(S): 2.5 TABLET ORAL at 05:31

## 2023-11-29 RX ADMIN — CHOLESTYRAMINE 4 GRAM(S): 4 POWDER, FOR SUSPENSION ORAL at 12:00

## 2023-11-29 RX ADMIN — Medication 650 MILLIGRAM(S): at 12:31

## 2023-11-29 RX ADMIN — Medication 2 DROP(S): at 17:59

## 2023-11-29 RX ADMIN — OXYCODONE HYDROCHLORIDE 5 MILLIGRAM(S): 5 TABLET ORAL at 12:04

## 2023-11-29 RX ADMIN — Medication 650 MILLIGRAM(S): at 17:58

## 2023-11-29 RX ADMIN — Medication 10 MILLIGRAM(S): at 01:35

## 2023-11-29 RX ADMIN — Medication 1 DROP(S): at 05:34

## 2023-11-29 RX ADMIN — CHOLESTYRAMINE 4 GRAM(S): 4 POWDER, FOR SUSPENSION ORAL at 00:05

## 2023-11-29 RX ADMIN — Medication 1 DROP(S): at 02:13

## 2023-11-29 RX ADMIN — Medication 1 DROP(S): at 00:05

## 2023-11-29 RX ADMIN — CHOLESTYRAMINE 4 GRAM(S): 4 POWDER, FOR SUSPENSION ORAL at 17:58

## 2023-11-29 RX ADMIN — Medication 2 DROP(S): at 05:33

## 2023-11-29 RX ADMIN — OXYCODONE HYDROCHLORIDE 5 MILLIGRAM(S): 5 TABLET ORAL at 05:30

## 2023-11-29 RX ADMIN — Medication 650 MILLIGRAM(S): at 01:03

## 2023-11-29 RX ADMIN — Medication 650 MILLIGRAM(S): at 05:31

## 2023-11-29 RX ADMIN — ATORVASTATIN CALCIUM 40 MILLIGRAM(S): 80 TABLET, FILM COATED ORAL at 21:01

## 2023-11-29 RX ADMIN — CLOPIDOGREL BISULFATE 75 MILLIGRAM(S): 75 TABLET, FILM COATED ORAL at 12:00

## 2023-11-29 RX ADMIN — Medication 10 MILLIGRAM(S): at 12:04

## 2023-11-29 RX ADMIN — OXYCODONE HYDROCHLORIDE 5 MILLIGRAM(S): 5 TABLET ORAL at 18:31

## 2023-11-29 RX ADMIN — Medication 650 MILLIGRAM(S): at 23:36

## 2023-11-29 RX ADMIN — Medication 1 DROP(S): at 23:46

## 2023-11-29 RX ADMIN — OXYCODONE HYDROCHLORIDE 5 MILLIGRAM(S): 5 TABLET ORAL at 12:31

## 2023-11-29 RX ADMIN — OXYCODONE HYDROCHLORIDE 5 MILLIGRAM(S): 5 TABLET ORAL at 23:22

## 2023-11-29 RX ADMIN — Medication 650 MILLIGRAM(S): at 18:28

## 2023-11-29 RX ADMIN — Medication 1 DROP(S): at 11:59

## 2023-11-29 RX ADMIN — CHOLESTYRAMINE 4 GRAM(S): 4 POWDER, FOR SUSPENSION ORAL at 05:33

## 2023-11-29 RX ADMIN — OXYCODONE HYDROCHLORIDE 5 MILLIGRAM(S): 5 TABLET ORAL at 22:30

## 2023-11-29 RX ADMIN — CHOLESTYRAMINE 4 GRAM(S): 4 POWDER, FOR SUSPENSION ORAL at 23:37

## 2023-11-29 RX ADMIN — Medication 1 DROP(S): at 17:59

## 2023-11-29 RX ADMIN — Medication 2 DROP(S): at 11:59

## 2023-11-29 RX ADMIN — Medication 650 MILLIGRAM(S): at 00:05

## 2023-11-29 RX ADMIN — Medication 2 DROP(S): at 23:37

## 2023-11-29 RX ADMIN — LORATADINE 10 MILLIGRAM(S): 10 TABLET ORAL at 12:00

## 2023-11-29 RX ADMIN — Medication 650 MILLIGRAM(S): at 12:01

## 2023-11-29 RX ADMIN — Medication 1 DROP(S): at 12:00

## 2023-11-29 RX ADMIN — OXYCODONE HYDROCHLORIDE 5 MILLIGRAM(S): 5 TABLET ORAL at 18:01

## 2023-11-29 RX ADMIN — Medication 100 MILLIGRAM(S): at 17:59

## 2023-11-29 RX ADMIN — Medication 2 DROP(S): at 00:05

## 2023-11-29 NOTE — PROGRESS NOTE ADULT - SUBJECTIVE AND OBJECTIVE BOX
Patient is a 63y old  Female who presents with a chief complaint of Pain (28 Nov 2023 14:32)      Patient seen and examined at bedside.    ALLERGIES:  Benadryl (Unknown)  Ambien (Unknown)  promazine (Unknown)    MEDICATIONS  (STANDING):  acetaminophen     Tablet .. 650 milliGRAM(s) Oral every 6 hours  atorvastatin 40 milliGRAM(s) Oral at bedtime  cholestyramine Powder (Sugar-Free) 4 Gram(s) Oral four times a day  clopidogrel Tablet 75 milliGRAM(s) Oral daily  lisinopril 5 milliGRAM(s) Oral daily  loratadine 10 milliGRAM(s) Oral daily  metoprolol tartrate 100 milliGRAM(s) Oral two times a day  ofloxacin 0.3% Solution 1 Drop(s) Left EYE every 4 hours  ofloxacin 0.3% Solution 1 Drop(s) Left EYE four times a day  prednisoLONE acetate 1% Suspension 2 Drop(s) Left EYE four times a day    MEDICATIONS  (PRN):  aluminum hydroxide/magnesium hydroxide/simethicone Suspension 30 milliLiter(s) Oral every 4 hours PRN Dyspepsia  diazepam    Tablet 10 milliGRAM(s) Oral three times a day PRN anxiety  melatonin 3 milliGRAM(s) Oral at bedtime PRN Insomnia  ondansetron Injectable 4 milliGRAM(s) IV Push every 8 hours PRN Nausea and/or Vomiting  oxyCODONE    IR 5 milliGRAM(s) Oral every 4 hours PRN Moderate Pain (4 - 6)    Vital Signs Last 24 Hrs  T(F): 97.2 (29 Nov 2023 06:06), Max: 97.9 (28 Nov 2023 20:16)  HR: 48 (29 Nov 2023 06:06) (48 - 51)  BP: 192/86 (29 Nov 2023 06:06) (165/83 - 192/86)  RR: 18 (29 Nov 2023 06:06) (16 - 18)  SpO2: 98% (29 Nov 2023 06:06) (97% - 100%)  I&O's Summary    28 Nov 2023 07:01  -  29 Nov 2023 07:00  --------------------------------------------------------  IN: 1260 mL / OUT: 0 mL / NET: 1260 mL      PHYSICAL EXAM:  General: NAD, A/O x 3  ENT: MMM  Neck: Supple, No JVD  Lungs: Clear to auscultation bilaterally, Non labored breathing   Cardio: RRR, S1/S2, No murmurs  Abdomen: Soft, Nontender, Nondistended; Bowel sounds present  Extremities: No calf tenderness, No pitting edema    LABS:                        11.4   6.93  )-----------( 214      ( 28 Nov 2023 07:08 )             36.0     11-28    137  |  103  |  17  ----------------------------<  98  4.3   |  23  |  0.90    Ca    9.3      28 Nov 2023 07:08    TPro  6.6  /  Alb  3.2  /  TBili  1.1  /  DBili  x   /  AST  21  /  ALT  23  /  AlkPhos  81  11-27                                Urinalysis Basic - ( 28 Nov 2023 07:08 )    Color: x / Appearance: x / SG: x / pH: x  Gluc: 98 mg/dL / Ketone: x  / Bili: x / Urobili: x   Blood: x / Protein: x / Nitrite: x   Leuk Esterase: x / RBC: x / WBC x   Sq Epi: x / Non Sq Epi: x / Bacteria: x          RADIOLOGY & ADDITIONAL TESTS:    Care Discussed with Consultants/Other Providers:    Patient is a 63y old  Female who presents with a chief complaint of Pain (28 Nov 2023 14:32)      Patient seen and examined at bedside. Pt with complaint of pain everywhere- not unusual given amount of bruising     ALLERGIES:  Benadryl (Unknown)  Ambien (Unknown)  promazine (Unknown)    MEDICATIONS  (STANDING):  acetaminophen     Tablet .. 650 milliGRAM(s) Oral every 6 hours  atorvastatin 40 milliGRAM(s) Oral at bedtime  cholestyramine Powder (Sugar-Free) 4 Gram(s) Oral four times a day  clopidogrel Tablet 75 milliGRAM(s) Oral daily  lisinopril 5 milliGRAM(s) Oral daily  loratadine 10 milliGRAM(s) Oral daily  metoprolol tartrate 100 milliGRAM(s) Oral two times a day  ofloxacin 0.3% Solution 1 Drop(s) Left EYE every 4 hours  ofloxacin 0.3% Solution 1 Drop(s) Left EYE four times a day  prednisoLONE acetate 1% Suspension 2 Drop(s) Left EYE four times a day    MEDICATIONS  (PRN):  aluminum hydroxide/magnesium hydroxide/simethicone Suspension 30 milliLiter(s) Oral every 4 hours PRN Dyspepsia  diazepam    Tablet 10 milliGRAM(s) Oral three times a day PRN anxiety  melatonin 3 milliGRAM(s) Oral at bedtime PRN Insomnia  ondansetron Injectable 4 milliGRAM(s) IV Push every 8 hours PRN Nausea and/or Vomiting  oxyCODONE    IR 5 milliGRAM(s) Oral every 4 hours PRN Moderate Pain (4 - 6)    Vital Signs Last 24 Hrs  T(F): 97.2 (29 Nov 2023 06:06), Max: 97.9 (28 Nov 2023 20:16)  HR: 48 (29 Nov 2023 06:06) (48 - 51)  BP: 192/86 (29 Nov 2023 06:06) (165/83 - 192/86)  RR: 18 (29 Nov 2023 06:06) (16 - 18)  SpO2: 98% (29 Nov 2023 06:06) (97% - 100%)  I&O's Summary    28 Nov 2023 07:01  -  29 Nov 2023 07:00  --------------------------------------------------------  IN: 1260 mL / OUT: 0 mL / NET: 1260 mL      PHYSICAL EXAM:  General: 62 y/o female with generalized areas of ecchymosis - face legs trunk from fall in NAD, A/O x 3  ENT: MMM  Neck: Supple, No JVD  Lungs: Clear to auscultation bilaterally, Non labored breathing   Cardio: RRR, S1/S2, No murmurs  Abdomen: Soft, Nontender, Nondistended; Bowel sounds present  Extremities: No calf tenderness, bilt leg swelling with ecchymosis of left leg     LABS:                        11.4   6.93  )-----------( 214      ( 28 Nov 2023 07:08 )             36.0     11-28    137  |  103  |  17  ----------------------------<  98  4.3   |  23  |  0.90    Ca    9.3      28 Nov 2023 07:08    TPro  6.6  /  Alb  3.2  /  TBili  1.1  /  DBili  x   /  AST  21  /  ALT  23  /  AlkPhos  81  11-27                                Urinalysis Basic - ( 28 Nov 2023 07:08 )    Color: x / Appearance: x / SG: x / pH: x  Gluc: 98 mg/dL / Ketone: x  / Bili: x / Urobili: x   Blood: x / Protein: x / Nitrite: x   Leuk Esterase: x / RBC: x / WBC x   Sq Epi: x / Non Sq Epi: x / Bacteria: x          RADIOLOGY & ADDITIONAL TESTS:    Care Discussed with Consultants/Other Providers:

## 2023-11-29 NOTE — PROGRESS NOTE ADULT - ASSESSMENT
63y F On Plavix for stroke (right sided residual weakness) and ACS, HTN, HLD, anxiety presents to the ER for left eye swelling.  Patient was seen in the ER 2 days ago for a mechanical fall suffered bruising to the left eye left forehead area.  Had CT imaging of the head neck and face with did not show evidence of any acute fracture.  Patient was discharged home however states that the swelling has worsened, endorsing 8/10 pain over the left side of the face, pain to both legs and generalized weakness.     LT eye Periorbital Edema  LT eye Trauma  Gait Instability  pt admitted to observation with LT eye trauma status post fall   CT head negative, Ct Maxillofacial with left scalp hematoma, no facial or orbital fx, CT C/S negative  Opthalmology consult appreciated, soft tissue injury s/p fall, no fx on CT, cont eye drops   Pain management, flexeril, follow up outpatient  PT evaluation appreciated, pt agreeable to [facility]  Medically cleared [facility] pending auth    CVA  continue plavix    HTN  continue metoprolol, lisinopril    HLD  continue statin therapy    Anxiety  continue valium    DVT PPx  encourage ambulation    Goals of Care  DNR    plans to update family 63y F On Plavix for stroke (right sided residual weakness) and ACS, HTN, HLD, anxiety presents to the ER for left eye swelling.  Patient was seen in the ER 2 days ago for a mechanical fall suffered bruising to the left eye left forehead area , chest and extremities .  Had CT imaging of the head neck and face with did not show evidence of any acute fracture.  Patient was discharged home however states that the swelling has worsened, endorsing 8/10 pain over the left side of the face, pain to both legs and generalized weakness.     LT eye Periorbital Edema  LT eye Trauma  Gait Instability  pt admitted to observation with LT eye trauma status post fall   CT head negative, Ct Maxillofacial with left scalp hematoma, no facial or orbital fx, CT C/S negative  Opthalmology consult appreciated, soft tissue injury s/p fall, no fx on CT, cont eye drops   Pain management, flexeril, follow up outpatient  PT evaluation appreciated, pt agreeable to [facility]  Medically cleared [facility] pending auth  left lower extremity ecchymosis of shin  - will order xray     CVA  continue plavix    HTN  continue metoprolol, lisinopril    HLD  continue statin therapy    Anxiety  continue valium    DVT PPx  encourage ambulation    Goals of Care  DNR    pt will  update family 63y F On Plavix for stroke (right sided residual weakness) and ACS, HTN, HLD, anxiety presents to the ER for left eye swelling.  Patient was seen in the ER 2 days ago for a mechanical fall suffered bruising to the left eye left forehead area , chest and extremities .  Had CT imaging of the head neck and face with did not show evidence of any acute fracture.  Patient was discharged home however states that the swelling has worsened, endorsing 8/10 pain over the left side of the face, pain to both legs and generalized weakness.     LT eye Periorbital Edema  LT eye Trauma  Gait Instability  pt admitted to observation with LT eye trauma status post fall   CT head negative, Ct Maxillofacial with left scalp hematoma, no facial or orbital fx, CT C/S negative  Opthalmology consult appreciated, soft tissue injury s/p fall, no fx on CT, cont eye drops   Pain management, flexeril, follow up outpatient  PT evaluation appreciated, pt agreeable to [facility]  Medically cleared [facility] pending auth  left lower extremity ecchymosis of shin  - f/u xray - no acute fx-noted     CVA  continue plavix    HTN  continue metoprolol, lisinopril    HLD  continue statin therapy    Anxiety  continue valium    DVT PPx  encourage ambulation    Goals of Care  DNR    pt will  update family  plan for DC to [facility] pending auth

## 2023-11-30 ENCOUNTER — APPOINTMENT (OUTPATIENT)
Dept: FAMILY MEDICINE | Facility: CLINIC | Age: 63
End: 2023-11-30

## 2023-11-30 PROCEDURE — 99233 SBSQ HOSP IP/OBS HIGH 50: CPT

## 2023-11-30 PROCEDURE — 93010 ELECTROCARDIOGRAM REPORT: CPT

## 2023-11-30 PROCEDURE — 99222 1ST HOSP IP/OBS MODERATE 55: CPT

## 2023-11-30 RX ADMIN — OXYCODONE HYDROCHLORIDE 5 MILLIGRAM(S): 5 TABLET ORAL at 17:24

## 2023-11-30 RX ADMIN — Medication 1 DROP(S): at 12:16

## 2023-11-30 RX ADMIN — Medication 1 DROP(S): at 06:04

## 2023-11-30 RX ADMIN — Medication 2 DROP(S): at 12:16

## 2023-11-30 RX ADMIN — OXYCODONE HYDROCHLORIDE 5 MILLIGRAM(S): 5 TABLET ORAL at 12:16

## 2023-11-30 RX ADMIN — LISINOPRIL 5 MILLIGRAM(S): 2.5 TABLET ORAL at 06:04

## 2023-11-30 RX ADMIN — CHOLESTYRAMINE 4 GRAM(S): 4 POWDER, FOR SUSPENSION ORAL at 17:24

## 2023-11-30 RX ADMIN — Medication 10 MILLIGRAM(S): at 04:25

## 2023-11-30 RX ADMIN — Medication 650 MILLIGRAM(S): at 00:23

## 2023-11-30 RX ADMIN — OXYCODONE HYDROCHLORIDE 5 MILLIGRAM(S): 5 TABLET ORAL at 23:20

## 2023-11-30 RX ADMIN — Medication 2 DROP(S): at 23:21

## 2023-11-30 RX ADMIN — ATORVASTATIN CALCIUM 40 MILLIGRAM(S): 80 TABLET, FILM COATED ORAL at 21:40

## 2023-11-30 RX ADMIN — Medication 1 DROP(S): at 17:24

## 2023-11-30 RX ADMIN — Medication 650 MILLIGRAM(S): at 06:04

## 2023-11-30 RX ADMIN — OXYCODONE HYDROCHLORIDE 5 MILLIGRAM(S): 5 TABLET ORAL at 13:16

## 2023-11-30 RX ADMIN — CLOPIDOGREL BISULFATE 75 MILLIGRAM(S): 75 TABLET, FILM COATED ORAL at 12:16

## 2023-11-30 RX ADMIN — OXYCODONE HYDROCHLORIDE 5 MILLIGRAM(S): 5 TABLET ORAL at 18:24

## 2023-11-30 RX ADMIN — Medication 650 MILLIGRAM(S): at 06:50

## 2023-11-30 RX ADMIN — Medication 2 DROP(S): at 06:04

## 2023-11-30 RX ADMIN — LORATADINE 10 MILLIGRAM(S): 10 TABLET ORAL at 12:16

## 2023-11-30 RX ADMIN — Medication 1 DROP(S): at 23:21

## 2023-11-30 RX ADMIN — CHOLESTYRAMINE 4 GRAM(S): 4 POWDER, FOR SUSPENSION ORAL at 23:21

## 2023-11-30 RX ADMIN — CHOLESTYRAMINE 4 GRAM(S): 4 POWDER, FOR SUSPENSION ORAL at 06:04

## 2023-11-30 RX ADMIN — Medication 2 DROP(S): at 17:25

## 2023-11-30 RX ADMIN — CHOLESTYRAMINE 4 GRAM(S): 4 POWDER, FOR SUSPENSION ORAL at 12:16

## 2023-11-30 NOTE — PROGRESS NOTE ADULT - SUBJECTIVE AND OBJECTIVE BOX
Patient is a 63y old  Female who presents with a chief complaint of Pain (29 Nov 2023 07:31)      Patient seen and examined at bedside. No overnight events reported. Pt sleeping, well appearing.     ALLERGIES:  Benadryl (Unknown)  Ambien (Unknown)  promazine (Unknown)    MEDICATIONS  (STANDING):  acetaminophen     Tablet .. 650 milliGRAM(s) Oral every 6 hours  atorvastatin 40 milliGRAM(s) Oral at bedtime  cholestyramine Powder (Sugar-Free) 4 Gram(s) Oral four times a day  clopidogrel Tablet 75 milliGRAM(s) Oral daily  lisinopril 5 milliGRAM(s) Oral daily  loratadine 10 milliGRAM(s) Oral daily  ofloxacin 0.3% Solution 1 Drop(s) Left EYE four times a day  prednisoLONE acetate 1% Suspension 2 Drop(s) Left EYE four times a day    MEDICATIONS  (PRN):  aluminum hydroxide/magnesium hydroxide/simethicone Suspension 30 milliLiter(s) Oral every 4 hours PRN Dyspepsia  diazepam    Tablet 10 milliGRAM(s) Oral three times a day PRN anxiety  melatonin 3 milliGRAM(s) Oral at bedtime PRN Insomnia  ondansetron Injectable 4 milliGRAM(s) IV Push every 8 hours PRN Nausea and/or Vomiting  oxyCODONE    IR 5 milliGRAM(s) Oral every 4 hours PRN Moderate Pain (4 - 6)    Vital Signs Last 24 Hrs  T(F): 97.2 (30 Nov 2023 05:30), Max: 97.6 (29 Nov 2023 16:48)  HR: 42 (30 Nov 2023 05:30) (42 - 53)  BP: 136/72 (30 Nov 2023 05:30) (104/62 - 160/78)  RR: 18 (30 Nov 2023 05:30) (17 - 18)  SpO2: 99% (30 Nov 2023 05:30) (95% - 99%)  I&O's Summary    29 Nov 2023 07:01  -  30 Nov 2023 07:00  --------------------------------------------------------  IN: 1340 mL / OUT: 0 mL / NET: 1340 mL      PHYSICAL EXAM:  General: NAD, A/O x 3  Eyes/Face: b/l ecchymosis noted around eyes various stages of healing   ENT: No gross hearing impairment, Moist mucous membranes, no thrush  Neck: Supple, No JVD  Lungs: Clear to auscultation bilaterally, good air entry, non-labored breathing  Cardio: RRR, S1/S2, No murmur  Abdomen: Soft, Nontender, Nondistended; Bowel sounds present  Extremities: No calf tenderness, No cyanosis, No pitting edema  Psych: Appropriate mood and affect    LABS:                        11.4   6.93  )-----------( 214      ( 28 Nov 2023 07:08 )             36.0     11-28    137  |  103  |  17  ----------------------------<  98  4.3   |  23  |  0.90    Ca    9.3      28 Nov 2023 07:08                                        Urinalysis Basic - ( 28 Nov 2023 07:08 )    Color: x / Appearance: x / SG: x / pH: x  Gluc: 98 mg/dL / Ketone: x  / Bili: x / Urobili: x   Blood: x / Protein: x / Nitrite: x   Leuk Esterase: x / RBC: x / WBC x   Sq Epi: x / Non Sq Epi: x / Bacteria: x          RADIOLOGY & ADDITIONAL TESTS:    Care Discussed with Consultants/Other Providers:    Patient is a 63y old  Female who presents with a chief complaint of Pain (29 Nov 2023 07:31)      Patient seen and examined at bedside. No overnight events reported. Pt sleeping, well appearing.     ALLERGIES:  Benadryl (Unknown)  Ambien (Unknown)  promazine (Unknown)    MEDICATIONS  (STANDING):  acetaminophen     Tablet .. 650 milliGRAM(s) Oral every 6 hours  atorvastatin 40 milliGRAM(s) Oral at bedtime  cholestyramine Powder (Sugar-Free) 4 Gram(s) Oral four times a day  clopidogrel Tablet 75 milliGRAM(s) Oral daily  lisinopril 5 milliGRAM(s) Oral daily  loratadine 10 milliGRAM(s) Oral daily  ofloxacin 0.3% Solution 1 Drop(s) Left EYE four times a day  prednisoLONE acetate 1% Suspension 2 Drop(s) Left EYE four times a day    MEDICATIONS  (PRN):  aluminum hydroxide/magnesium hydroxide/simethicone Suspension 30 milliLiter(s) Oral every 4 hours PRN Dyspepsia  diazepam    Tablet 10 milliGRAM(s) Oral three times a day PRN anxiety  melatonin 3 milliGRAM(s) Oral at bedtime PRN Insomnia  ondansetron Injectable 4 milliGRAM(s) IV Push every 8 hours PRN Nausea and/or Vomiting  oxyCODONE    IR 5 milliGRAM(s) Oral every 4 hours PRN Moderate Pain (4 - 6)    Vital Signs Last 24 Hrs  T(F): 97.2 (30 Nov 2023 05:30), Max: 97.6 (29 Nov 2023 16:48)  HR: 42 (30 Nov 2023 05:30) (42 - 53)  BP: 136/72 (30 Nov 2023 05:30) (104/62 - 160/78)  RR: 18 (30 Nov 2023 05:30) (17 - 18)  SpO2: 99% (30 Nov 2023 05:30) (95% - 99%)  I&O's Summary    29 Nov 2023 07:01  -  30 Nov 2023 07:00  --------------------------------------------------------  IN: 1340 mL / OUT: 0 mL / NET: 1340 mL      PHYSICAL EXAM:  General: NAD, A/O x 3  Eyes/Face: b/l ecchymosis noted around eyes various stages of healing   ENT: No gross hearing impairment, Moist mucous membranes, no thrush  Neck: Supple, No JVD  Lungs: Clear to auscultation bilaterally, good air entry, non-labored breathing  Cardio: RRR, S1/S2, No murmur  Abdomen: Soft, Nontender, Nondistended; Bowel sounds present  Extremities: No calf tenderness, No cyanosis, No pitting edema  Psych: Appropriate mood and affect    LABS:                        11.4   6.93  )-----------( 214      ( 28 Nov 2023 07:08 )             36.0     11-28    137  |  103  |  17  ----------------------------<  98  4.3   |  23  |  0.90    Ca    9.3      28 Nov 2023 07:08    Urinalysis Basic - ( 28 Nov 2023 07:08 )    Color: x / Appearance: x / SG: x / pH: x  Gluc: 98 mg/dL / Ketone: x  / Bili: x / Urobili: x   Blood: x / Protein: x / Nitrite: x   Leuk Esterase: x / RBC: x / WBC x   Sq Epi: x / Non Sq Epi: x / Bacteria: x      RADIOLOGY & ADDITIONAL TESTS:    Care Discussed with Consultants/Other Providers:

## 2023-11-30 NOTE — PROGRESS NOTE ADULT - ASSESSMENT
63y F On Plavix for stroke (right sided residual weakness) and ACS, HTN, HLD, anxiety presents to the ER for left eye swelling.  Patient was seen in the ER 2 days ago for a mechanical fall suffered bruising to the left eye left forehead area , chest and extremities .  Had CT imaging of the head neck and face with did not show evidence of any acute fracture.  Patient was discharged home however states that the swelling has worsened, endorsing 8/10 pain over the left side of the face, pain to both legs and generalized weakness.     LT eye Periorbital Edema  LT eye Trauma  Gait Instability  pt admitted to observation with LT eye trauma status post fall   CT head negative, Ct Maxillofacial with left scalp hematoma, no facial or orbital fx, CT C/S negative  Opthalmology consult appreciated, soft tissue injury s/p fall, no fx on CT, cont eye drops   Pain management, flexeril, follow up outpatient  PT evaluation appreciated, pt agreeable to [facility]  Medically cleared [facility] pending auth  left lower extremity ecchymosis of shin - no acute fx-noted     CVA  continue plavix    HTN  continue metoprolol, lisinopril    HLD  continue statin therapy    Asymptomatic Bradycardia  f/u EKG   monitor for symptoms    Anxiety  continue valium    DVT PPx  encourage ambulation    Goals of Care  DNR    Dispo: plan for DC to [facility] pending auth    patient will update family  63y F On Plavix for stroke (right sided residual weakness) and ACS, HTN, HLD, anxiety presents to the ER for left eye swelling.  Patient was seen in the ER 2 days ago for a mechanical fall suffered bruising to the left eye left forehead area , chest and extremities .  Had CT imaging of the head neck and face with did not show evidence of any acute fracture.  Patient was discharged home however states that the swelling has worsened, endorsing 8/10 pain over the left side of the face, pain to both legs and generalized weakness.     LT eye Periorbital Edema  LT eye Trauma  Gait Instability  - Pt admitted to observation with LT eye trauma status post fall   - CT head negative, Ct Maxillofacial with left scalp hematoma, no facial or orbital fx, CT C/S negative  - Opthalmology consult appreciated, soft tissue injury s/p fall, no fx on CT, cont eye drops   - Pain management, flexeril, follow up outpatient  - PT evaluation appreciated, pt agreeable to [facility]  - Medically cleared [facility] pending auth  - Left lower extremity ecchymosis of shin - no acute fx-noted     CVA  - continue plavix    HTN  - continue metoprolol, lisinopril    HLD  - continue statin therapy    Symptomatic Bradycardia  - EKG showed sinus bradycardia, BP stable  - Pt. stated she has started to become dizzy   - D/C metoprolol, place patient on telemetry, cardiology consult pending     Anxiety  - continue valium PRN for anxiety     DVT PPx  - encourage ambulation  - Intermittent pneumatic compression     Goals of Care  DNR/DNI    Dispo: plan for DC to [facility] pending auth    patient will update family  63y F On Plavix for stroke (right sided residual weakness) and ACS, HTN, HLD, anxiety presents to the ER for left eye swelling.  Patient was seen in the ER 2 days ago for a mechanical fall suffered bruising to the left eye left forehead area , chest and extremities .  Had CT imaging of the head neck and face with did not show evidence of any acute fracture.  Patient was discharged home however states that the swelling has worsened, endorsing 8/10 pain over the left side of the face, pain to both legs and generalized weakness.     LT eye Periorbital Edema  LT eye Trauma  Gait Instability  - CT head negative, Ct Maxillofacial with left scalp hematoma, no facial or orbital fx, CT C/S negative  - Opthalmology consult appreciated, soft tissue injury s/p fall, no fx on CT, cont eye drops   - Pain management, flexeril, follow up outpatient  - PT evaluation appreciated, pt agreeable to [facility]  - Left lower extremity ecchymosis of shin - no acute fx-noted     Symptomatic Bradycardia  - EKG showed sinus bradycardia, BP stable  - Pt. stated she has started to become dizzy   - D/C metoprolol, place patient on telemetry, cardiology consult pending     CVA  - continue plavix    HTN  - continue metoprolol, lisinopril    HLD  - continue statin therapy    Anxiety  - continue valium PRN for anxiety     DVT PPx  - encourage ambulation  - Intermittent pneumatic compression     Goals of Care  DNR/DNI    Dispo: pending cards consult, then plan for DC to [facility] pending auth    patient will update family  63y F On Plavix for stroke (right sided residual weakness) and ACS, HTN, HLD, anxiety presents to the ER for left eye swelling.  Patient was seen in the ER 2 days ago for a mechanical fall suffered bruising to the left eye left forehead area , chest and extremities .  Had CT imaging of the head neck and face with did not show evidence of any acute fracture.  Patient was discharged home however states that the swelling has worsened, endorsing 8/10 pain over the left side of the face, pain to both legs and generalized weakness.     LT eye Periorbital Edema  LT eye Trauma  Gait Instability  - CT head negative, Ct Maxillofacial with left scalp hematoma, no facial or orbital fx, CT C/S negative  - Opthalmology consult appreciated, soft tissue injury s/p fall, no fx on CT, cont eye drops   - Pain management, flexeril, follow up outpatient  - PT evaluation appreciated, pt agreeable to [facility]  - Left lower extremity ecchymosis of shin - no acute fx-noted     Symptomatic Bradycardia  - EKG showed sinus bradycardia, BP stable  - Pt. stated she has started to become dizzy   - D/C metoprolol, place patient on telemetry, cardiology consult pending     CVA  - continue plavix    HTN  - d/c metoprolol as above  - continue lisinopril, uptitrate as needed     HLD  - continue statin therapy    Anxiety  - continue valium PRN for anxiety     DVT PPx  - encourage ambulation  - Intermittent pneumatic compression     Goals of Care  DNR/DNI    Dispo: pending cards consult, then plan for DC to [facility] pending auth    patient will update family

## 2023-11-30 NOTE — CONSULT NOTE ADULT - SUBJECTIVE AND OBJECTIVE BOX
BISHNU MULLIGAN  [id number]      HPI:   63y F On Plavix for stroke (right sided residual weakness) and ACS, HTN, HLD presents to the ER for left eye swelling.  Patient was seen in the ER 2 days ago for a mechanical fall suffered bruising to the left eye left forehead area.  Had CT imaging of the head neck and face with did not show evidence of any acute fracture.  Patient was discharged home however states that the swelling has worsened still endorsing 8/10 pain over the left side of the face, pain to both legs and generalized weakness.  Pain unrelieved by Tylenol and Aleve. States she feels uncomfortable and can't sleep. No additional complaints.       ALLERGIES:  Benadryl (Unknown)  Ambien (Unknown)  promazine (Unknown)      PAST MEDICAL & SURGICAL HISTORY:  History of heart attack    Stroke        CURRENT MEDICATIONS:  MEDICATIONS  (STANDING):  acetaminophen     Tablet .. 650 milliGRAM(s) Oral every 6 hours  atorvastatin 40 milliGRAM(s) Oral at bedtime  cholestyramine Powder (Sugar-Free) 4 Gram(s) Oral four times a day  clopidogrel Tablet 75 milliGRAM(s) Oral daily  lisinopril 5 milliGRAM(s) Oral daily  loratadine 10 milliGRAM(s) Oral daily  ofloxacin 0.3% Solution 1 Drop(s) Left EYE four times a day  prednisoLONE acetate 1% Suspension 2 Drop(s) Left EYE four times a day    MEDICATIONS  (PRN):  aluminum hydroxide/magnesium hydroxide/simethicone Suspension 30 milliLiter(s) Oral every 4 hours PRN Dyspepsia  diazepam    Tablet 10 milliGRAM(s) Oral three times a day PRN anxiety  melatonin 3 milliGRAM(s) Oral at bedtime PRN Insomnia  ondansetron Injectable 4 milliGRAM(s) IV Push every 8 hours PRN Nausea and/or Vomiting  oxyCODONE    IR 5 milliGRAM(s) Oral every 4 hours PRN Moderate Pain (4 - 6)      SOCIAL HISTORY:      FAMILY HISTORY:  unknown    ROS:  All 10 systems reviewed and positives noted in HPI    OBJECTIVE:    VITAL SIGNS:  Vital Signs Last 24 Hrs  T(C): 36.2 (30 Nov 2023 05:30), Max: 36.4 (29 Nov 2023 16:48)  T(F): 97.2 (30 Nov 2023 05:30), Max: 97.6 (29 Nov 2023 16:48)  HR: 42 (30 Nov 2023 05:30) (42 - 53)  BP: 136/72 (30 Nov 2023 05:30) (104/62 - 160/78)  BP(mean): --  RR: 18 (30 Nov 2023 05:30) (17 - 18)  SpO2: 99% (30 Nov 2023 05:30) (95% - 99%)    Parameters below as of 29 Nov 2023 19:45  Patient On (Oxygen Delivery Method): room air      PHYSICAL EXAM:  General: well appearing, no distress  HEENT: bruising at left side of face & b/l orbital region  Neck: supple, no carotid bruits b/l  CVS: JVP ~ 7 cm H20, RRR, s1, s2, no murmurs/rubs/gallops  Chest: unlabored respirations, clear to auscultation b/l  Abdomen: non-distended  Extremities: no lower extremity edema b/l, hematoma at lle  Neuro: awake, alert & oriented x 3  Psych: normal affect      LABS:        ECG:  Sinus grant with nonspecific twave abnormality. Previous ekg 10/16/22 was sinus rhythm and 10/15/22 was sinus grant     TTE:     BISHNU MULLIGAN  [id number]    HPI:   63y F On Plavix for stroke (right sided residual weakness) and ACS, HTN, HLD presents to the ER for left eye swelling.  Patient was seen in the ER 2 days ago for a mechanical fall suffered bruising to the left eye left forehead area.  Had CT imaging of the head neck and face with did not show evidence of any acute fracture.  Patient was discharged home however states that the swelling has worsened still endorsing 8/10 pain over the left side of the face, pain to both legs and generalized weakness.  Pain unrelieved by Tylenol and Aleve. States she feels uncomfortable and can't sleep. No additional complaints.       ALLERGIES:  Benadryl (Unknown)  Ambien (Unknown)  promazine (Unknown)      PAST MEDICAL & SURGICAL HISTORY:  History of heart attack    Stroke        CURRENT MEDICATIONS:  MEDICATIONS  (STANDING):  acetaminophen     Tablet .. 650 milliGRAM(s) Oral every 6 hours  atorvastatin 40 milliGRAM(s) Oral at bedtime  cholestyramine Powder (Sugar-Free) 4 Gram(s) Oral four times a day  clopidogrel Tablet 75 milliGRAM(s) Oral daily  lisinopril 5 milliGRAM(s) Oral daily  loratadine 10 milliGRAM(s) Oral daily  ofloxacin 0.3% Solution 1 Drop(s) Left EYE four times a day  prednisoLONE acetate 1% Suspension 2 Drop(s) Left EYE four times a day    MEDICATIONS  (PRN):  aluminum hydroxide/magnesium hydroxide/simethicone Suspension 30 milliLiter(s) Oral every 4 hours PRN Dyspepsia  diazepam    Tablet 10 milliGRAM(s) Oral three times a day PRN anxiety  melatonin 3 milliGRAM(s) Oral at bedtime PRN Insomnia  ondansetron Injectable 4 milliGRAM(s) IV Push every 8 hours PRN Nausea and/or Vomiting  oxyCODONE    IR 5 milliGRAM(s) Oral every 4 hours PRN Moderate Pain (4 - 6)      SOCIAL HISTORY:      FAMILY HISTORY:  unknown    ROS:  All 10 systems reviewed and positives noted in HPI    OBJECTIVE:    VITAL SIGNS:  Vital Signs Last 24 Hrs  T(C): 36.2 (30 Nov 2023 05:30), Max: 36.4 (29 Nov 2023 16:48)  T(F): 97.2 (30 Nov 2023 05:30), Max: 97.6 (29 Nov 2023 16:48)  HR: 42 (30 Nov 2023 05:30) (42 - 53)  BP: 136/72 (30 Nov 2023 05:30) (104/62 - 160/78)  BP(mean): --  RR: 18 (30 Nov 2023 05:30) (17 - 18)  SpO2: 99% (30 Nov 2023 05:30) (95% - 99%)    Parameters below as of 29 Nov 2023 19:45  Patient On (Oxygen Delivery Method): room air      PHYSICAL EXAM:  General: well appearing, no distress obvious hematoma and laceration overlying the left orbit however appears clean and without exudate nontoxic-appearing anxious multiple tattoos   HEENT: bruising at left side of face & b/l orbital region  Neck: supple, no carotid bruits b/l  CVS: JVP ~ 7 cm H20, RRR, s1, s2, no murmurs/rubs/gallops  Chest: unlabored respirations, clear to auscultation b/l  Abdomen: non-distended  Extremities: no lower extremity edema b/l, hematoma at lle  Neuro: awake, alert & oriented x 3  Psych: normal affect      LABS:        ECG:  Sinus grant with nonspecific Twave abnormality. Previous ekg 10/16/22 was sinus rhythm and 10/15/22 was sinus grant     TTE:  pending    < from: CT Head No Cont (11.24.23 @ 21:19) >  ACC: [id number] EXAM:  CT MAXILLOFACIAL   ORDERED BY: NANCY SHEN     ACC: [id number] EXAM:  CT CERVICAL SPINE   ORDERED BY: NANCY SHEN     ACC: [id number] EXAM:  CT BRAIN   ORDERED BY: NANCY SHEN     PROCEDURE DATE:  11/24/2023          INTERPRETATION:  CT HEAD, CT MAXILLOFACIAL, CT CERVICAL SPINE:    INDICATIONS:  Fall    TECHNIQUE:  Multiple contiguous axial images were obtained from the   cervical spine to the vertex without the use of intravenous contrast.   Additionally, axial images were obtained through the cervical spine using   multislice helical technique. Reformatted coronal and sagittal images   were performed.    COMPARISON EXAMINATION: CT head 10/15/2022.    CT HEAD:    FINDINGS:  BRAIN PARENCHYMA: Gray-white matter discrimination is well preserved.   Patchy bilateral white matter hypoattenuation is present. There is no   mass effect or intracranial hemorrhage.    EXTRA-AXIAL COMPARTMENTS: No extra-axial fluid collections are present.   The ventricles and sulci are stable in size and configuration; the basal   cisterns are patent. Craniocervical junction and sella turcica are within   normal limits.    CALVARIUM: The calvarium is intact.      CT MAXILLOFACIAL    FINDINGS:    SOFT TISSUES: Left frontal scalp hematoma is present. Abnormal dentition   identified bilaterally with numerous dental caries present. Large   periapical lucencies identified surrounding the roots of the left second   and third mandibular molars and left third maxillary molar.   Hyperattenuating amorphous material is identified in the region of the   left first and second maxillary molars.    FACIAL BONES: Normal.    MANDIBLE: Normal.    SINONASAL CAVITIES: Normal.    ORBITAL CONTENTS: Normal.      CT CERVICAL SPINE:    FINDINGS:    ALIGNMENT: Straightening of the spine, with grade 1 anterolisthesis C4   over C5. The lateral masses of C1 and C2 are appropriately aligned    VERTEBRAE: The vertebral bodies are normal in height. There is no   fracture or aggressive osseous lesion.    DISCS: The disc spaces are diminished secondary to degenerative change   throughout the cervical spine, with near complete ankylosis from C2 to C4.    EVALUATION OF INDIVIDUAL LEVELS DEMONSTRATES: Multilevel ventral disc   osteophyte complexes arepresent throughout the cervical spine,   contributing to varying degrees of mild to moderate central canal   stenosis. Additionally, there is multilevel hypertrophic facet and   uncovertebral arthropathy present contributing to varying degrees of   bilateral neural foraminal narrowing.    PARAVERTEBRAL SOFT TISSUES: The visualized paravertebral soft tissues   appear within normal limits.      IMPRESSION:    CT HEAD:  No intracranial hemorrhage or mass effect. Calvarium intact.    CT MAXILLOFACIAL:  Left frontal scalp hematoma present. No facial fractures or orbital   injury.    Extensive odontogenic disease identified bilaterally, with numerous   dental caries and periapical lucencies involving the roots of left   maxillary/mandibular molars.Underlying periapical abscesses cannot be   excluded. Correlation with dental exam recommended.    CT CERVICAL SPINE:  No cervical spine fractures or traumatic malalignment.    Multilevel cervical spondylosis, described above.    --- End of Report ---             TIRSO SAMPSON MD; Attending Radiologist  This document has been electronically signed. Nov 24 2023  9:55PM    < end of copied text >      < from: 12 Lead ECG (11.30.23 @ 09:12) >  T Axis 28 degrees    Diagnosis Line Sinus bradycardia  Possible Left atrial enlargement  Left ventricular hypertrophy  Nonspecific T wave abnormality  Abnormal ECG  No previous ECGs available  Confirmed by VIKTOR KENNY MD ([id number]) on 11/30/2023 3:36:07 PM    < end of copied text >              impression  62 yo History of a cardiac cath and stent coronary disease chronic kidney disease diarrhea herpes simplex hyperlipidemia hypertension now with a mechanical fall in the setting of nausea suggestive of vagotonia    mechanical fall  continue to monitor rule out bradycardia echo to evaluate for valvular and myocardial disease    trauma  further trauma evaluation for lacerations to the head and neck obvious hematoma and laceration overlying the left orbit however appears clean and without exudate nontoxic-appearing anxious multiple tattoos     cad s/p cardiac stenting  continue clopidogrel atorvastatin and losartan for cad.

## 2023-11-30 NOTE — CONSULT NOTE ADULT - ASSESSMENT
Assessment:  63y F On Plavix for stroke (right sided residual weakness) and ACS, HTN, HLD admitted for pain s/p fall. Now found to be bradycardic associated with dizziness, cardiology consulted. Pt denies chest pain or sob but endorses headache, dizziness and back pain. States she follows with Dr Vásquez and paloma finished a treadmill stress and nuclear stress due to fatigue and side effects of medication respectively.     Recommendation:   [ ] Bradycardia: EKG nonischemic. Obtain TTE. Pt was on metoprolol tart 100 BID- has been discontinued. Monitor on tele.     Allison RITCHIE-BC   Assessment:  63y F On Plavix for stroke (right sided residual weakness) and ACS, HTN, HLD admitted for pain s/p fall. Now found to be bradycardic associated with dizziness, cardiology consulted. Pt denies chest pain or sob but endorses headache, dizziness and back pain. States she follows with Dr Vásquez and hasnt finished a treadmill stress and nuclear stress due to fatigue and side effects of medication respectively.     Recommendation:   [ ] Bradycardia: s/p fall. CTH and XR neg for fracture. EKG nonischemic. Obtain TTE. Pt was on metoprolol tart 100 BID- has been discontinued. Monitor on tele.   May benefit from outpatient cardiac monitoring, can followup with her cardiologist Dr Fahad MCCAINCNP-BC

## 2023-11-30 NOTE — CONSULT NOTE ADULT - NS ATTEND OPT1 GEN_ALL_CORE
86M with PMH of HTN, HLD, DM, hypothyroidism, prostate CA A-flutter (s/p ablation, on Eliquis), COPD, CAD (s/p CABG x3), CVA s/p L CEA (6/19) presents to [facility] for AMS and increased sleepiness x1 day, admitted to the MICU for septic shock 2/2 MRSA bacteremia, being treated for infective endocarditis.    Recommendations:  - No acute vascular surgery intervention at this time  - Continue care per medical team  - Will follow  - Call [phone] with questions or acute changes I attest my time as attending is greater than 50% of the total combined time spent on qualifying patient care activities by the PA/NP and attending.

## 2023-12-01 VITALS — WEIGHT: 214.73 LBS

## 2023-12-01 LAB
ANION GAP SERPL CALC-SCNC: 10 MMOL/L — SIGNIFICANT CHANGE UP (ref 5–17)
ANION GAP SERPL CALC-SCNC: 10 MMOL/L — SIGNIFICANT CHANGE UP (ref 5–17)
BUN SERPL-MCNC: 15 MG/DL — SIGNIFICANT CHANGE UP (ref 7–23)
BUN SERPL-MCNC: 15 MG/DL — SIGNIFICANT CHANGE UP (ref 7–23)
CALCIUM SERPL-MCNC: 8.8 MG/DL — SIGNIFICANT CHANGE UP (ref 8.4–10.5)
CALCIUM SERPL-MCNC: 8.8 MG/DL — SIGNIFICANT CHANGE UP (ref 8.4–10.5)
CHLORIDE SERPL-SCNC: 104 MMOL/L — SIGNIFICANT CHANGE UP (ref 96–108)
CHLORIDE SERPL-SCNC: 104 MMOL/L — SIGNIFICANT CHANGE UP (ref 96–108)
CO2 SERPL-SCNC: 24 MMOL/L — SIGNIFICANT CHANGE UP (ref 22–31)
CO2 SERPL-SCNC: 24 MMOL/L — SIGNIFICANT CHANGE UP (ref 22–31)
CREAT SERPL-MCNC: 0.87 MG/DL — SIGNIFICANT CHANGE UP (ref 0.5–1.3)
CREAT SERPL-MCNC: 0.87 MG/DL — SIGNIFICANT CHANGE UP (ref 0.5–1.3)
EGFR: 76 ML/MIN/1.73M2 — SIGNIFICANT CHANGE UP
EGFR: 76 ML/MIN/1.73M2 — SIGNIFICANT CHANGE UP
GLUCOSE SERPL-MCNC: 85 MG/DL — SIGNIFICANT CHANGE UP (ref 70–99)
GLUCOSE SERPL-MCNC: 85 MG/DL — SIGNIFICANT CHANGE UP (ref 70–99)
HCT VFR BLD CALC: 33.6 % — LOW (ref 34.5–45)
HCT VFR BLD CALC: 33.6 % — LOW (ref 34.5–45)
HGB BLD-MCNC: 10.5 G/DL — LOW (ref 11.5–15.5)
HGB BLD-MCNC: 10.5 G/DL — LOW (ref 11.5–15.5)
MAGNESIUM SERPL-MCNC: 1.9 MG/DL — SIGNIFICANT CHANGE UP (ref 1.6–2.6)
MAGNESIUM SERPL-MCNC: 1.9 MG/DL — SIGNIFICANT CHANGE UP (ref 1.6–2.6)
MCHC RBC-ENTMCNC: 27.1 PG — SIGNIFICANT CHANGE UP (ref 27–34)
MCHC RBC-ENTMCNC: 27.1 PG — SIGNIFICANT CHANGE UP (ref 27–34)
MCHC RBC-ENTMCNC: 31.3 GM/DL — LOW (ref 32–36)
MCHC RBC-ENTMCNC: 31.3 GM/DL — LOW (ref 32–36)
MCV RBC AUTO: 86.6 FL — SIGNIFICANT CHANGE UP (ref 80–100)
MCV RBC AUTO: 86.6 FL — SIGNIFICANT CHANGE UP (ref 80–100)
NRBC # BLD: 0 /100 WBCS — SIGNIFICANT CHANGE UP (ref 0–0)
NRBC # BLD: 0 /100 WBCS — SIGNIFICANT CHANGE UP (ref 0–0)
PLATELET # BLD AUTO: 200 K/UL — SIGNIFICANT CHANGE UP (ref 150–400)
PLATELET # BLD AUTO: 200 K/UL — SIGNIFICANT CHANGE UP (ref 150–400)
POTASSIUM SERPL-MCNC: 4.1 MMOL/L — SIGNIFICANT CHANGE UP (ref 3.5–5.3)
POTASSIUM SERPL-MCNC: 4.1 MMOL/L — SIGNIFICANT CHANGE UP (ref 3.5–5.3)
POTASSIUM SERPL-SCNC: 4.1 MMOL/L — SIGNIFICANT CHANGE UP (ref 3.5–5.3)
POTASSIUM SERPL-SCNC: 4.1 MMOL/L — SIGNIFICANT CHANGE UP (ref 3.5–5.3)
RBC # BLD: 3.88 M/UL — SIGNIFICANT CHANGE UP (ref 3.8–5.2)
RBC # BLD: 3.88 M/UL — SIGNIFICANT CHANGE UP (ref 3.8–5.2)
RBC # FLD: 15.3 % — HIGH (ref 10.3–14.5)
RBC # FLD: 15.3 % — HIGH (ref 10.3–14.5)
SODIUM SERPL-SCNC: 138 MMOL/L — SIGNIFICANT CHANGE UP (ref 135–145)
SODIUM SERPL-SCNC: 138 MMOL/L — SIGNIFICANT CHANGE UP (ref 135–145)
TROPONIN I, HIGH SENSITIVITY RESULT: 12.5 NG/L — SIGNIFICANT CHANGE UP
TROPONIN I, HIGH SENSITIVITY RESULT: 12.5 NG/L — SIGNIFICANT CHANGE UP
TSH SERPL-MCNC: 0.81 UIU/ML — SIGNIFICANT CHANGE UP (ref 0.36–3.74)
TSH SERPL-MCNC: 0.81 UIU/ML — SIGNIFICANT CHANGE UP (ref 0.36–3.74)
WBC # BLD: 5.94 K/UL — SIGNIFICANT CHANGE UP (ref 3.8–10.5)
WBC # BLD: 5.94 K/UL — SIGNIFICANT CHANGE UP (ref 3.8–10.5)
WBC # FLD AUTO: 5.94 K/UL — SIGNIFICANT CHANGE UP (ref 3.8–10.5)
WBC # FLD AUTO: 5.94 K/UL — SIGNIFICANT CHANGE UP (ref 3.8–10.5)

## 2023-12-01 PROCEDURE — 84443 ASSAY THYROID STIM HORMONE: CPT

## 2023-12-01 PROCEDURE — 80048 BASIC METABOLIC PNL TOTAL CA: CPT

## 2023-12-01 PROCEDURE — 73590 X-RAY EXAM OF LOWER LEG: CPT

## 2023-12-01 PROCEDURE — 85027 COMPLETE CBC AUTOMATED: CPT

## 2023-12-01 PROCEDURE — 83735 ASSAY OF MAGNESIUM: CPT

## 2023-12-01 PROCEDURE — 99231 SBSQ HOSP IP/OBS SF/LOW 25: CPT

## 2023-12-01 PROCEDURE — 96374 THER/PROPH/DIAG INJ IV PUSH: CPT

## 2023-12-01 PROCEDURE — 93306 TTE W/DOPPLER COMPLETE: CPT

## 2023-12-01 PROCEDURE — 93005 ELECTROCARDIOGRAM TRACING: CPT

## 2023-12-01 PROCEDURE — G0378: CPT

## 2023-12-01 PROCEDURE — 99285 EMERGENCY DEPT VISIT HI MDM: CPT

## 2023-12-01 PROCEDURE — 96375 TX/PRO/DX INJ NEW DRUG ADDON: CPT

## 2023-12-01 PROCEDURE — 84484 ASSAY OF TROPONIN QUANT: CPT

## 2023-12-01 PROCEDURE — 85025 COMPLETE CBC W/AUTO DIFF WBC: CPT

## 2023-12-01 PROCEDURE — 93306 TTE W/DOPPLER COMPLETE: CPT | Mod: 26

## 2023-12-01 PROCEDURE — 97116 GAIT TRAINING THERAPY: CPT

## 2023-12-01 PROCEDURE — 86803 HEPATITIS C AB TEST: CPT

## 2023-12-01 PROCEDURE — 36415 COLL VENOUS BLD VENIPUNCTURE: CPT

## 2023-12-01 PROCEDURE — 99239 HOSP IP/OBS DSCHRG MGMT >30: CPT

## 2023-12-01 PROCEDURE — 80053 COMPREHEN METABOLIC PANEL: CPT

## 2023-12-01 RX ORDER — OFLOXACIN 0.3 %
1 DROPS OPHTHALMIC (EYE)
Qty: 0 | Refills: 0 | DISCHARGE
Start: 2023-12-01

## 2023-12-01 RX ORDER — LISINOPRIL 2.5 MG/1
1 TABLET ORAL
Qty: 0 | Refills: 0 | DISCHARGE
Start: 2023-12-01

## 2023-12-01 RX ORDER — METOPROLOL TARTRATE 50 MG
1 TABLET ORAL
Refills: 0 | DISCHARGE

## 2023-12-01 RX ORDER — LISINOPRIL 2.5 MG/1
2 TABLET ORAL
Refills: 0 | DISCHARGE

## 2023-12-01 RX ORDER — PREDNISOLONE SODIUM PHOSPHATE 1 %
2 DROPS OPHTHALMIC (EYE)
Qty: 0 | Refills: 0 | DISCHARGE
Start: 2023-12-01

## 2023-12-01 RX ORDER — OXYCODONE HYDROCHLORIDE 5 MG/1
5 TABLET ORAL ONCE
Refills: 0 | Status: DISCONTINUED | OUTPATIENT
Start: 2023-12-01 | End: 2023-12-01

## 2023-12-01 RX ADMIN — Medication 650 MILLIGRAM(S): at 05:04

## 2023-12-01 RX ADMIN — LISINOPRIL 5 MILLIGRAM(S): 2.5 TABLET ORAL at 05:04

## 2023-12-01 RX ADMIN — Medication 2 DROP(S): at 05:05

## 2023-12-01 RX ADMIN — OXYCODONE HYDROCHLORIDE 5 MILLIGRAM(S): 5 TABLET ORAL at 12:11

## 2023-12-01 RX ADMIN — OXYCODONE HYDROCHLORIDE 5 MILLIGRAM(S): 5 TABLET ORAL at 00:20

## 2023-12-01 RX ADMIN — LORATADINE 10 MILLIGRAM(S): 10 TABLET ORAL at 12:10

## 2023-12-01 RX ADMIN — CHOLESTYRAMINE 4 GRAM(S): 4 POWDER, FOR SUSPENSION ORAL at 05:04

## 2023-12-01 RX ADMIN — CLOPIDOGREL BISULFATE 75 MILLIGRAM(S): 75 TABLET, FILM COATED ORAL at 12:10

## 2023-12-01 RX ADMIN — Medication 2 DROP(S): at 12:10

## 2023-12-01 RX ADMIN — Medication 650 MILLIGRAM(S): at 05:51

## 2023-12-01 RX ADMIN — Medication 10 MILLIGRAM(S): at 03:12

## 2023-12-01 RX ADMIN — OXYCODONE HYDROCHLORIDE 5 MILLIGRAM(S): 5 TABLET ORAL at 01:43

## 2023-12-01 RX ADMIN — Medication 1 DROP(S): at 05:05

## 2023-12-01 RX ADMIN — OXYCODONE HYDROCHLORIDE 5 MILLIGRAM(S): 5 TABLET ORAL at 00:43

## 2023-12-01 RX ADMIN — CHOLESTYRAMINE 4 GRAM(S): 4 POWDER, FOR SUSPENSION ORAL at 12:10

## 2023-12-01 RX ADMIN — Medication 1 DROP(S): at 12:10

## 2023-12-01 RX ADMIN — OXYCODONE HYDROCHLORIDE 5 MILLIGRAM(S): 5 TABLET ORAL at 13:11

## 2023-12-01 NOTE — PROGRESS NOTE ADULT - ASSESSMENT
Assessment:  63y F On Plavix for stroke (right sided residual weakness) and ACS, HTN, HLD admitted for pain s/p fall. Now found to be bradycardic associated with dizziness, cardiology consulted. Pt denies chest pain or sob but endorses headache, dizziness and back pain. States she follows with Dr Vásquez and hasn't finished a treadmill stress and nuclear stress due to fatigue and side effects of medication respectively.     Recommendation:   [ ] Bradycardia: s/p fall. CTH and XR neg for fracture. EKG nonischemic. TTE with normal EF, mild mitral and tricuspid regurgitation. Pt was on metoprolol tart 100 BID- has been discontinued. Monitor on tele. Trop pending.  Continue remaining CV meds for hx CAD with stents    May benefit from outpatient cardiac monitoring, can followup with her cardiologist Dr Fahad MCCAINCNP-BC

## 2023-12-01 NOTE — DIETITIAN INITIAL EVALUATION ADULT - OTHER INFO
Patient reports good appetite at this time. Consuming % of meals as per nursing flowsheets. Food preferences obtained and noted on patients file with nutrition office. Patient reports that she does not like eggs. Skin noted ecchymotic. 1+ b/l leg edema noted. Last BM noted on 11/28/23. Recommend continue current nutrition plan of care as tolerated.

## 2023-12-01 NOTE — DIETITIAN INITIAL EVALUATION ADULT - PERTINENT MEDS FT
MEDICATIONS  (STANDING):  acetaminophen     Tablet .. 650 milliGRAM(s) Oral every 6 hours  atorvastatin 40 milliGRAM(s) Oral at bedtime  cholestyramine Powder (Sugar-Free) 4 Gram(s) Oral four times a day  clopidogrel Tablet 75 milliGRAM(s) Oral daily  lisinopril 5 milliGRAM(s) Oral daily  loratadine 10 milliGRAM(s) Oral daily  ofloxacin 0.3% Solution 1 Drop(s) Left EYE four times a day  prednisoLONE acetate 1% Suspension 2 Drop(s) Left EYE four times a day    MEDICATIONS  (PRN):  aluminum hydroxide/magnesium hydroxide/simethicone Suspension 30 milliLiter(s) Oral every 4 hours PRN Dyspepsia  diazepam    Tablet 10 milliGRAM(s) Oral three times a day PRN anxiety  melatonin 3 milliGRAM(s) Oral at bedtime PRN Insomnia  ondansetron Injectable 4 milliGRAM(s) IV Push every 8 hours PRN Nausea and/or Vomiting  oxyCODONE    IR 5 milliGRAM(s) Oral every 4 hours PRN Moderate Pain (4 - 6)

## 2023-12-01 NOTE — PROGRESS NOTE ADULT - SUBJECTIVE AND OBJECTIVE BOX
BISHNU MULLIGAN  [id number]    interval events: Pt c/o generalized body pain and headache, reports dizziness. Sinus 50-70s bpm on tele    ALLERGIES:  Benadryl (Unknown)  Ambien (Unknown)  promazine (Unknown)      PAST MEDICAL & SURGICAL HISTORY:  History of heart attack    Stroke        CURRENT MEDICATIONS:  MEDICATIONS  (STANDING):  acetaminophen     Tablet .. 650 milliGRAM(s) Oral every 6 hours  atorvastatin 40 milliGRAM(s) Oral at bedtime  cholestyramine Powder (Sugar-Free) 4 Gram(s) Oral four times a day  clopidogrel Tablet 75 milliGRAM(s) Oral daily  lisinopril 5 milliGRAM(s) Oral daily  loratadine 10 milliGRAM(s) Oral daily  ofloxacin 0.3% Solution 1 Drop(s) Left EYE four times a day  prednisoLONE acetate 1% Suspension 2 Drop(s) Left EYE four times a day    MEDICATIONS  (PRN):  aluminum hydroxide/magnesium hydroxide/simethicone Suspension 30 milliLiter(s) Oral every 4 hours PRN Dyspepsia  diazepam    Tablet 10 milliGRAM(s) Oral three times a day PRN anxiety  melatonin 3 milliGRAM(s) Oral at bedtime PRN Insomnia  ondansetron Injectable 4 milliGRAM(s) IV Push every 8 hours PRN Nausea and/or Vomiting  oxyCODONE    IR 5 milliGRAM(s) Oral every 4 hours PRN Moderate Pain (4 - 6)      SOCIAL HISTORY:      FAMILY HISTORY:  unknown    ROS:  All 10 systems reviewed and positives noted in HPI    OBJECTIVE:    VITAL SIGNS:  Vital Signs Last 24 Hrs  T(C): 36.2 (30 Nov 2023 05:30), Max: 36.4 (29 Nov 2023 16:48)  T(F): 97.2 (30 Nov 2023 05:30), Max: 97.6 (29 Nov 2023 16:48)  HR: 42 (30 Nov 2023 05:30) (42 - 53)  BP: 136/72 (30 Nov 2023 05:30) (104/62 - 160/78)  BP(mean): --  RR: 18 (30 Nov 2023 05:30) (17 - 18)  SpO2: 99% (30 Nov 2023 05:30) (95% - 99%)    Parameters below as of 29 Nov 2023 19:45  Patient On (Oxygen Delivery Method): room air      PHYSICAL EXAM:  General: well appearing, no distress obvious hematoma and laceration overlying the left orbit however appears clean and without exudate nontoxic-appearing anxious multiple tattoos   HEENT: bruising at left side of face & b/l orbital region  Neck: supple, no carotid bruits b/l  CVS: JVP ~ 7 cm H20, RRR, s1, s2, no murmurs/rubs/gallops  Chest: unlabored respirations, clear to auscultation b/l  Abdomen: non-distended  Extremities: no lower extremity edema b/l, hematoma at lle  Neuro: awake, alert & oriented x 3  Psych: normal affect      LABS:        ECG:  Sinus grant with nonspecific Twave abnormality. Previous ekg 10/16/22 was sinus rhythm and 10/15/22 was sinus grant     TTE:< from: TTE Echo Complete w/o Contrast w/ Doppler (12.01.23 @ 08:56) >   1. Left ventricular ejection fraction, by visual estimation, is 60 to   65%.   2. Normal global left ventricular systolic function.   3. Increased LV wall thickness.   4. Normal left ventricular internal cavity size.   5. There is mild concentric left ventricular hypertrophy.  6. Mildly enlarged left atrium.   7. Degenerative mitral valve.   8. Mild mitral valve regurgitation.   9. Thickening and calcification of the anterior and posterior mitral   valve leaflets.  10. Mild tricuspid regurgitation.  11. Sclerotic aortic valve with decreased opening.      < from: CT Head No Cont (11.24.23 @ 21:19) >  ACC: [id number] EXAM:  CT MAXILLOFACIAL   ORDERED BY: NANCY SHEN     ACC: [id number] EXAM:  CT CERVICAL SPINE   ORDERED BY: NANCY SHEN     ACC: [id number] EXAM:  CT BRAIN   ORDERED BY: NANCY SHEN     PROCEDURE DATE:  11/24/2023          INTERPRETATION:  CT HEAD, CT MAXILLOFACIAL, CT CERVICAL SPINE:    INDICATIONS:  Fall    TECHNIQUE:  Multiple contiguous axial images were obtained from the   cervical spine to the vertex without the use of intravenous contrast.   Additionally, axial images were obtained through the cervical spine using   multislice helical technique. Reformatted coronal and sagittal images   were performed.    COMPARISON EXAMINATION: CT head 10/15/2022.    CT HEAD:    FINDINGS:  BRAIN PARENCHYMA: Gray-white matter discrimination is well preserved.   Patchy bilateral white matter hypoattenuation is present. There is no   mass effect or intracranial hemorrhage.    EXTRA-AXIAL COMPARTMENTS: No extra-axial fluid collections are present.   The ventricles and sulci are stable in size and configuration; the basal   cisterns are patent. Craniocervical junction and sella turcica are within   normal limits.    CALVARIUM: The calvarium is intact.      CT MAXILLOFACIAL    FINDINGS:    SOFT TISSUES: Left frontal scalp hematoma is present. Abnormal dentition   identified bilaterally with numerous dental caries present. Large   periapical lucencies identified surrounding the roots of the left second   and third mandibular molars and left third maxillary molar.   Hyperattenuating amorphous material is identified in the region of the   left first and second maxillary molars.    FACIAL BONES: Normal.    MANDIBLE: Normal.    SINONASAL CAVITIES: Normal.    ORBITAL CONTENTS: Normal.      CT CERVICAL SPINE:    FINDINGS:    ALIGNMENT: Straightening of the spine, with grade 1 anterolisthesis C4   over C5. The lateral masses of C1 and C2 are appropriately aligned    VERTEBRAE: The vertebral bodies are normal in height. There is no   fracture or aggressive osseous lesion.    DISCS: The disc spaces are diminished secondary to degenerative change   throughout the cervical spine, with near complete ankylosis from C2 to C4.    EVALUATION OF INDIVIDUAL LEVELS DEMONSTRATES: Multilevel ventral disc   osteophyte complexes arepresent throughout the cervical spine,   contributing to varying degrees of mild to moderate central canal   stenosis. Additionally, there is multilevel hypertrophic facet and   uncovertebral arthropathy present contributing to varying degrees of   bilateral neural foraminal narrowing.    PARAVERTEBRAL SOFT TISSUES: The visualized paravertebral soft tissues   appear within normal limits.      IMPRESSION:    CT HEAD:  No intracranial hemorrhage or mass effect. Calvarium intact.    CT MAXILLOFACIAL:  Left frontal scalp hematoma present. No facial fractures or orbital   injury.    Extensive odontogenic disease identified bilaterally, with numerous   dental caries and periapical lucencies involving the roots of left   maxillary/mandibular molars.Underlying periapical abscesses cannot be   excluded. Correlation with dental exam recommended.    CT CERVICAL SPINE:  No cervical spine fractures or traumatic malalignment.    Multilevel cervical spondylosis, described above.    --- End of Report ---             TIRSO SAMPSON MD; Attending Radiologist  This document has been electronically signed. Nov 24 2023  9:55PM    < end of copied text >      < from: 12 Lead ECG (11.30.23 @ 09:12) >  T Axis 28 degrees    Diagnosis Line Sinus bradycardia  Possible Left atrial enlargement  Left ventricular hypertrophy  Nonspecific T wave abnormality  Abnormal ECG  No previous ECGs available  Confirmed by VIKTOR KENNY MD ([id number]) on 11/30/2023 3:36:07 PM    < end of copied text >

## 2023-12-01 NOTE — PROGRESS NOTE ADULT - NS ATTEND AMEND GEN_ALL_CORE FT
Pt seen and examined at bedside.  No acute events overnight  Pt denies cp, palpitations, sob, abd pain, N/V, fever, chills    Pt s/p fall p/w b/l Periorbital swelling and pain, noted to have significant ecchymosis and swelling of b/l Eyes more prominent of left eye with subconjunctival hemorrhage  Complaints of pain and sensation of glass in her eye. No visual impairment   Generalized body pain and aches.     Ophtho consult to assess for foreign body in eye  Continue eye drop abx + Steroid     ICE for pain and swelling of eyes and the rest of body  PT recommendations for [facility]
Pt seen and examined at bedside.  No acute events overnight  Pt denies cp, palpitations, sob, abd pain, N/V, fever, chills    Pt s/p fall p/w b/l Periorbital swelling and pain, noted to have significant ecchymosis and swelling of b/l Eyes more prominent of left eye with subconjunctival hemorrhage  Complaints of pain and sensation of glass in her eye. No visual impairment   Generalized body pain and aches.     Ophtho consult to assess for foreign body in eye  Start eye drops     ICE for pain and swelling of eyes and the rest of body  PT consult
Pt seen and examined at bedside.  No acute events overnight  Pt denies cp, palpitations, sob, abd pain, N/V, fever, chills    Pt s/p fall p/w b/l Periorbital swelling and pain, noted to have significant ecchymosis and swelling of b/l Eyes more prominent of left eye with subconjunctival hemorrhage  Complaints of pain and sensation of glass in her eye. No visual impairment   Generalized body pain and aches.     Ophtho consult to assess for foreign body in eye  Continue eye drop abx + Steroid     ICE for pain and swelling of eyes and the rest of body  PT recommendations for [facility]KI.
Patient seen and examined at bedside. No events no telemetry.  HR improved, hemodynamically stable. Patient overall feels well, denies further LH/dizziness. No chest pain/shortness of breath.   Echo done showed EF 60 to 65%. Mild mitral valve regurgitation. Trop negative.   Cardiology follow up appreciated.  Stable for discharge to [facility].
Patient seen and examined at bedside. No acute events noted.  Noted to have bradycardia w/ HR 42-52 last two days, intermittently receiving metoprolol. BP stable. Denies chest pain/SOB. Endorses occasional dizziness.   EKG done personally reviewed by me showed sinus bradycardia w/ nonspecific T wave changes.   Metoprolol held, placed on telemetry. Check TSH. Cardiology eval pending.  Continue pain control.  Dispo to [facility] pending auth

## 2023-12-01 NOTE — DIETITIAN INITIAL EVALUATION ADULT - ORAL INTAKE PTA/DIET HISTORY
Patient reports good appetite prior to admission. No known food allergies/intolerances. Patient does not follow any therapeutic meal patterns.

## 2023-12-01 NOTE — PROGRESS NOTE ADULT - ASSESSMENT
63y F On Plavix for stroke (right sided residual weakness) and ACS, HTN, HLD, anxiety presents to the ER for left eye swelling.  Patient was seen in the ER 2 days ago for a mechanical fall suffered bruising to the left eye left forehead area , chest and extremities .  Had CT imaging of the head neck and face with did not show evidence of any acute fracture.  Patient was discharged home however states that the swelling has worsened, endorsing 8/10 pain over the left side of the face, pain to both legs and generalized weakness.     LT eye Periorbital Edema  LT eye Trauma  Gait Instability  - CT head negative, Ct Maxillofacial with left scalp hematoma, no facial or orbital fx, CT C/S negative  - Opthalmology consult appreciated, soft tissue injury s/p fall, no fx on CT, cont eye drops   - Pain management, flexeril, follow up outpatient  - PT evaluation appreciated, pt agreeable to [facility]KI  - Left lower extremity ecchymosis of shin - no acute fx-noted     Symptomatic Bradycardia  - EKG showed sinus bradycardia (nonischemic)  - Pt. stated she has started to become dizzy   - D/C metoprolol, place patient on telemetry, cardiology consult appreciated  - Follow up ECHO, monitor BP, outpatient cardiology follow up    CVA  - continue plavix    HTN  - d/c metoprolol as above  - continue lisinopril, uptitrate as needed     HLD  - continue statin therapy    Anxiety  - continue valium PRN for anxiety     DVT PPx  - encourage ambulation  - Intermittent pneumatic compression     Goals of Care  DNR/DNI    Dispo: pending cards consult, then plan for DC to [facility] pending auth    patient will update family  63y F On Plavix for stroke (right sided residual weakness) and ACS, HTN, HLD, anxiety presents to the ER for left eye swelling.  Patient was seen in the ER 2 days ago for a mechanical fall suffered bruising to the left eye left forehead area , chest and extremities .  Had CT imaging of the head neck and face with did not show evidence of any acute fracture.  Patient was discharged home however states that the swelling has worsened, endorsing 8/10 pain over the left side of the face, pain to both legs and generalized weakness.     LT eye Periorbital Edema  LT eye Trauma  Gait Instability  - CT head negative, Ct Maxillofacial with left scalp hematoma, no facial or orbital fx, CT C/S negative  - Opthalmology consult appreciated, soft tissue injury s/p fall, no fx on CT, cont eye drops   - Pain management, flexeril, follow up outpatient  - PT evaluation appreciated, pt agreeable to [facility]KI  - Left lower extremity ecchymosis of shin - no acute fx-noted     Symptomatic Bradycardia  - EKG showed sinus bradycardia (nonischemic)  - Pt. stated she has started to become dizzy   - D/C metoprolol, place patient on telemetry, cardiology consult appreciated  - Follow up ECHO, monitor BP, outpatient cardiology follow up    CVA  - continue plavix    HTN  - d/c metoprolol as above  - continue lisinopril, uptitrate as needed     HLD  - continue statin therapy    Anxiety  - continue valium PRN for anxiety     DVT PPx  - encourage ambulation  - Intermittent pneumatic compression     Goals of Care  DNR/DNI    Dispo: pending cards consult, then plan for DC to [facility] pending auth    patient will update family

## 2023-12-01 NOTE — DIETITIAN INITIAL EVALUATION ADULT - PERTINENT LABORATORY DATA
12-01    138  |  104  |  15  ----------------------------<  85  4.1   |  24  |  0.87    Ca    8.8      01 Dec 2023 06:58  Mg     1.9     12-01

## 2023-12-01 NOTE — PROGRESS NOTE ADULT - SUBJECTIVE AND OBJECTIVE BOX
Patient is a 63y old  Female who presents with a chief complaint of Pain (30 Nov 2023 16:27)    Patient seen and examined at bedside.  no acute overnight events    ALLERGIES:  Benadryl (Unknown)  Ambien (Unknown)  promazine (Unknown)        Vital Signs Last 24 Hrs  T(F): 97.2 (01 Dec 2023 04:52), Max: 98.4 (30 Nov 2023 16:30)  HR: 50 (01 Dec 2023 04:52) (50 - 62)  BP: 112/66 (01 Dec 2023 04:52) (112/66 - 156/89)  RR: 18 (01 Dec 2023 04:52) (18 - 18)  SpO2: 96% (01 Dec 2023 04:52) (96% - 99%)  I&O's Summary    30 Nov 2023 07:01  -  01 Dec 2023 07:00  --------------------------------------------------------  IN: 420 mL / OUT: 0 mL / NET: 420 mL      MEDICATIONS:  acetaminophen     Tablet .. 650 milliGRAM(s) Oral every 6 hours  aluminum hydroxide/magnesium hydroxide/simethicone Suspension 30 milliLiter(s) Oral every 4 hours PRN  atorvastatin 40 milliGRAM(s) Oral at bedtime  cholestyramine Powder (Sugar-Free) 4 Gram(s) Oral four times a day  clopidogrel Tablet 75 milliGRAM(s) Oral daily  diazepam    Tablet 10 milliGRAM(s) Oral three times a day PRN  lisinopril 5 milliGRAM(s) Oral daily  loratadine 10 milliGRAM(s) Oral daily  melatonin 3 milliGRAM(s) Oral at bedtime PRN  ofloxacin 0.3% Solution 1 Drop(s) Left EYE four times a day  ondansetron Injectable 4 milliGRAM(s) IV Push every 8 hours PRN  oxyCODONE    IR 5 milliGRAM(s) Oral every 4 hours PRN  prednisoLONE acetate 1% Suspension 2 Drop(s) Left EYE four times a day      PHYSICAL EXAM:  General: NAD, A/O x 3  HEENT: MMM, no oral thrush, left facial bruising, left periorbital edema/echymosis  Neck: Supple, No JVD  Lungs: Clear to auscultation bilaterally, non labored, good air entry  Cardio: RRR, S1/S2, No murmurs  Abdomen: Soft, Nontender, Nondistended; Bowel sounds present  Extremities: No cyanosis, No edema    LABS:                        10.5   5.94  )-----------( 200      ( 01 Dec 2023 06:58 )             33.6     12-01    138  |  104  |  15  ----------------------------<  85  4.1   |  24  |  0.87    Ca    8.8      01 Dec 2023 06:58  Mg     1.9     12-01                  TSH 0.807   TSH with FT4 reflex --  Total T3 --                  Urinalysis Basic - ( 01 Dec 2023 06:58 )    Color: x / Appearance: x / SG: x / pH: x  Gluc: 85 mg/dL / Ketone: x  / Bili: x / Urobili: x   Blood: x / Protein: x / Nitrite: x   Leuk Esterase: x / RBC: x / WBC x   Sq Epi: x / Non Sq Epi: x / Bacteria: x            RADIOLOGY & ADDITIONAL TESTS:    Care Discussed with Consultants/Other Providers:    Patient is a 63y old  Female who presents with a chief complaint of Pain (30 Nov 2023 16:27)    Patient seen and examined at bedside.  no acute overnight events    ALLERGIES:  Benadryl (Unknown)  Ambien (Unknown)  promazine (Unknown)    Vital Signs Last 24 Hrs  T(F): 97.2 (01 Dec 2023 04:52), Max: 98.4 (30 Nov 2023 16:30)  HR: 50 (01 Dec 2023 04:52) (50 - 62)  BP: 112/66 (01 Dec 2023 04:52) (112/66 - 156/89)  RR: 18 (01 Dec 2023 04:52) (18 - 18)  SpO2: 96% (01 Dec 2023 04:52) (96% - 99%)  I&O's Summary    30 Nov 2023 07:01  -  01 Dec 2023 07:00  --------------------------------------------------------  IN: 420 mL / OUT: 0 mL / NET: 420 mL      MEDICATIONS:  acetaminophen     Tablet .. 650 milliGRAM(s) Oral every 6 hours  aluminum hydroxide/magnesium hydroxide/simethicone Suspension 30 milliLiter(s) Oral every 4 hours PRN  atorvastatin 40 milliGRAM(s) Oral at bedtime  cholestyramine Powder (Sugar-Free) 4 Gram(s) Oral four times a day  clopidogrel Tablet 75 milliGRAM(s) Oral daily  diazepam    Tablet 10 milliGRAM(s) Oral three times a day PRN  lisinopril 5 milliGRAM(s) Oral daily  loratadine 10 milliGRAM(s) Oral daily  melatonin 3 milliGRAM(s) Oral at bedtime PRN  ofloxacin 0.3% Solution 1 Drop(s) Left EYE four times a day  ondansetron Injectable 4 milliGRAM(s) IV Push every 8 hours PRN  oxyCODONE    IR 5 milliGRAM(s) Oral every 4 hours PRN  prednisoLONE acetate 1% Suspension 2 Drop(s) Left EYE four times a day    PHYSICAL EXAM:  General: NAD, A/O x 3  HEENT: MMM, no oral thrush, left facial bruising, left periorbital edema/echymosis  Neck: Supple, No JVD  Lungs: Clear to auscultation bilaterally, non labored, good air entry  Cardio: RRR, S1/S2, No murmurs  Abdomen: Soft, Nontender, Nondistended; Bowel sounds present  Extremities: No cyanosis, No edema    LABS:                        10.5   5.94  )-----------( 200      ( 01 Dec 2023 06:58 )             33.6     12-01    138  |  104  |  15  ----------------------------<  85  4.1   |  24  |  0.87    Ca    8.8      01 Dec 2023 06:58  Mg     1.9     12-01    TSH 0.807   TSH with FT4 reflex --  Total T3 --    Urinalysis Basic - ( 01 Dec 2023 06:58 )    Color: x / Appearance: x / SG: x / pH: x  Gluc: 85 mg/dL / Ketone: x  / Bili: x / Urobili: x   Blood: x / Protein: x / Nitrite: x   Leuk Esterase: x / RBC: x / WBC x   Sq Epi: x / Non Sq Epi: x / Bacteria: x            RADIOLOGY & ADDITIONAL TESTS:    Care Discussed with Consultants/Other Providers:

## 2023-12-04 PROBLEM — I63.9 CEREBRAL INFARCTION, UNSPECIFIED: Chronic | Status: ACTIVE | Noted: 2023-11-26

## 2023-12-04 PROBLEM — I25.2 OLD MYOCARDIAL INFARCTION: Chronic | Status: ACTIVE | Noted: 2023-11-26

## 2023-12-04 RX ORDER — PREDNISOLONE ACETATE 10 MG/ML
1 SUSPENSION/ DROPS OPHTHALMIC TWICE DAILY
Refills: 0 | Status: ACTIVE | COMMUNITY
Start: 2023-12-04

## 2023-12-04 RX ORDER — ROSUVASTATIN CALCIUM 10 MG/1
10 TABLET, FILM COATED ORAL
Refills: 0 | Status: ACTIVE | COMMUNITY
Start: 2023-12-04

## 2023-12-04 RX ORDER — AZITHROMYCIN 250 MG/1
250 TABLET, FILM COATED ORAL
Qty: 1 | Refills: 0 | Status: COMPLETED | COMMUNITY
Start: 2023-11-02 | End: 2023-12-04

## 2023-12-04 RX ORDER — DOXYCYCLINE HYCLATE 100 MG/1
100 CAPSULE ORAL
Qty: 14 | Refills: 0 | Status: COMPLETED | COMMUNITY
Start: 2023-11-07 | End: 2023-12-04

## 2023-12-05 ENCOUNTER — NON-APPOINTMENT (OUTPATIENT)
Age: 63
End: 2023-12-05

## 2023-12-08 ENCOUNTER — APPOINTMENT (OUTPATIENT)
Dept: FAMILY MEDICINE | Facility: CLINIC | Age: 63
End: 2023-12-08
Payer: MEDICAID

## 2023-12-08 VITALS
BODY MASS INDEX: 31.49 KG/M2 | RESPIRATION RATE: 20 BRPM | HEIGHT: 65 IN | HEART RATE: 76 BPM | SYSTOLIC BLOOD PRESSURE: 125 MMHG | WEIGHT: 189 LBS | DIASTOLIC BLOOD PRESSURE: 70 MMHG

## 2023-12-08 DIAGNOSIS — T07.XXXA UNSPECIFIED MULTIPLE INJURIES, INITIAL ENCOUNTER: ICD-10-CM
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
## 2023-12-08 PROCEDURE — 99496 TRANSJ CARE MGMT HIGH F2F 7D: CPT

## 2023-12-15 ENCOUNTER — APPOINTMENT (OUTPATIENT)
Dept: PAIN MANAGEMENT | Facility: CLINIC | Age: 63
End: 2023-12-15

## 2023-12-21 DIAGNOSIS — R26.81 UNSTEADINESS ON FEET: ICD-10-CM

## 2023-12-21 DIAGNOSIS — R29.898 OTHER SYMPTOMS AND SIGNS INVOLVING THE MUSCULOSKELETAL SYSTEM: ICD-10-CM

## 2024-01-02 ENCOUNTER — NON-APPOINTMENT (OUTPATIENT)
Age: 64
End: 2024-01-02

## 2024-01-23 ENCOUNTER — APPOINTMENT (OUTPATIENT)
Dept: GASTROENTEROLOGY | Facility: CLINIC | Age: 64
End: 2024-01-23

## 2024-02-02 ENCOUNTER — NON-APPOINTMENT (OUTPATIENT)
Age: 64
End: 2024-02-02

## 2024-02-06 ENCOUNTER — APPOINTMENT (OUTPATIENT)
Dept: FAMILY MEDICINE | Facility: CLINIC | Age: 64
End: 2024-02-06
Payer: MEDICAID

## 2024-02-06 VITALS
WEIGHT: 189 LBS | RESPIRATION RATE: 20 BRPM | HEIGHT: 65 IN | SYSTOLIC BLOOD PRESSURE: 125 MMHG | BODY MASS INDEX: 31.49 KG/M2 | DIASTOLIC BLOOD PRESSURE: 70 MMHG | HEART RATE: 76 BPM

## 2024-02-06 PROCEDURE — 99214 OFFICE O/P EST MOD 30 MIN: CPT | Mod: 25

## 2024-02-06 PROCEDURE — 36415 COLL VENOUS BLD VENIPUNCTURE: CPT

## 2024-02-06 NOTE — HISTORY OF PRESENT ILLNESS
[de-identified] : Presents for BP check, labs, and general follow-up.  No new complaints - has ongoing neuropathy of LEs but is using all medication judiciously to maintain daily functioning.

## 2024-02-06 NOTE — PHYSICAL EXAM
[No Acute Distress] : no acute distress [Normal] : normal rate, regular rhythm, normal S1 and S2 and no murmur heard [No Edema] : there was no peripheral edema [Soft] : abdomen soft [Non Tender] : non-tender [Normal Posterior Cervical Nodes] : no posterior cervical lymphadenopathy [Normal Anterior Cervical Nodes] : no anterior cervical lymphadenopathy [Ataxic, Wide-Based] : wide-based and ataxic [Motor Strength Lower Extremities Bilaterally] : there was weakness in both lower extremities [Limited Balance] : the patient's balance was impaired [Alert and Oriented x3] : oriented to person, place, and time

## 2024-02-07 ENCOUNTER — RX RENEWAL (OUTPATIENT)
Age: 64
End: 2024-02-07

## 2024-02-07 LAB
ALBUMIN SERPL ELPH-MCNC: 4.2 G/DL
ALP BLD-CCNC: 108 U/L
ALT SERPL-CCNC: 8 U/L
ANION GAP SERPL CALC-SCNC: 14 MMOL/L
AST SERPL-CCNC: 16 U/L
BILIRUB SERPL-MCNC: 0.6 MG/DL
BUN SERPL-MCNC: 10 MG/DL
CALCIUM SERPL-MCNC: 9.3 MG/DL
CHLORIDE SERPL-SCNC: 105 MMOL/L
CHOLEST SERPL-MCNC: 186 MG/DL
CO2 SERPL-SCNC: 24 MMOL/L
CREAT SERPL-MCNC: 0.63 MG/DL
EGFR: 101 ML/MIN/1.73M2
ESTIMATED AVERAGE GLUCOSE: 108 MG/DL
FOLATE SERPL-MCNC: 12.2 NG/ML
GLUCOSE SERPL-MCNC: 100 MG/DL
HBA1C MFR BLD HPLC: 5.4 %
HCT VFR BLD CALC: 38 %
HDLC SERPL-MCNC: 56 MG/DL
HGB BLD-MCNC: 12.1 G/DL
LDLC SERPL CALC-MCNC: 106 MG/DL
MCHC RBC-ENTMCNC: 27.3 PG
MCHC RBC-ENTMCNC: 31.8 GM/DL
MCV RBC AUTO: 85.8 FL
NONHDLC SERPL-MCNC: 130 MG/DL
PLATELET # BLD AUTO: 258 K/UL
POTASSIUM SERPL-SCNC: 4 MMOL/L
PROT SERPL-MCNC: 7 G/DL
RBC # BLD: 4.43 M/UL
RBC # FLD: 14.2 %
SODIUM SERPL-SCNC: 143 MMOL/L
T4 FREE SERPL-MCNC: 1.1 NG/DL
TRIGL SERPL-MCNC: 137 MG/DL
TSH SERPL-ACNC: 0.42 UIU/ML
VIT B12 SERPL-MCNC: 366 PG/ML
WBC # FLD AUTO: 5.95 K/UL

## 2024-02-11 ENCOUNTER — TRANSCRIPTION ENCOUNTER (OUTPATIENT)
Age: 64
End: 2024-02-11

## 2024-02-11 ENCOUNTER — RX RENEWAL (OUTPATIENT)
Age: 64
End: 2024-02-11

## 2024-02-13 ENCOUNTER — RX RENEWAL (OUTPATIENT)
Age: 64
End: 2024-02-13

## 2024-02-13 RX ORDER — MELOXICAM 15 MG/1
15 TABLET ORAL
Qty: 30 | Refills: 0 | Status: ACTIVE | COMMUNITY
Start: 2024-01-16 | End: 1900-01-01

## 2024-02-15 ENCOUNTER — RX RENEWAL (OUTPATIENT)
Age: 64
End: 2024-02-15

## 2024-02-15 RX ORDER — LIDOCAINE 5% 700 MG/1
5 PATCH TOPICAL
Qty: 90 | Refills: 0 | Status: ACTIVE | COMMUNITY
Start: 2023-03-11 | End: 1900-01-01

## 2024-02-21 ENCOUNTER — APPOINTMENT (OUTPATIENT)
Dept: ORTHOPEDIC SURGERY | Facility: CLINIC | Age: 64
End: 2024-02-21
Payer: MEDICAID

## 2024-02-21 DIAGNOSIS — M17.11 UNILATERAL PRIMARY OSTEOARTHRITIS, RIGHT KNEE: ICD-10-CM

## 2024-02-21 DIAGNOSIS — M17.12 UNILATERAL PRIMARY OSTEOARTHRITIS, LEFT KNEE: ICD-10-CM

## 2024-02-21 DIAGNOSIS — M16.51 UNILATERAL POST-TRAUMATIC OSTEOARTHRITIS, RIGHT HIP: ICD-10-CM

## 2024-02-21 DIAGNOSIS — M16.12 UNILATERAL PRIMARY OSTEOARTHRITIS, LEFT HIP: ICD-10-CM

## 2024-02-21 PROCEDURE — 73564 X-RAY EXAM KNEE 4 OR MORE: CPT | Mod: 50

## 2024-02-21 PROCEDURE — 99214 OFFICE O/P EST MOD 30 MIN: CPT

## 2024-02-21 PROCEDURE — 73522 X-RAY EXAM HIPS BI 3-4 VIEWS: CPT

## 2024-02-23 ENCOUNTER — TRANSCRIPTION ENCOUNTER (OUTPATIENT)
Age: 64
End: 2024-02-23

## 2024-02-28 ENCOUNTER — APPOINTMENT (OUTPATIENT)
Dept: ORTHOPEDIC SURGERY | Facility: CLINIC | Age: 64
End: 2024-02-28

## 2024-03-04 ENCOUNTER — NON-APPOINTMENT (OUTPATIENT)
Age: 64
End: 2024-03-04

## 2024-04-05 ENCOUNTER — NON-APPOINTMENT (OUTPATIENT)
Age: 64
End: 2024-04-05

## 2024-04-12 RX ORDER — VALACYCLOVIR 500 MG/1
500 TABLET, FILM COATED ORAL
Qty: 20 | Refills: 0 | Status: ACTIVE | COMMUNITY
Start: 2018-11-28 | End: 1900-01-01

## 2024-04-23 ENCOUNTER — APPOINTMENT (OUTPATIENT)
Dept: FAMILY MEDICINE | Facility: CLINIC | Age: 64
End: 2024-04-23

## 2024-05-06 ENCOUNTER — APPOINTMENT (OUTPATIENT)
Dept: FAMILY MEDICINE | Facility: CLINIC | Age: 64
End: 2024-05-06

## 2024-05-06 ENCOUNTER — NON-APPOINTMENT (OUTPATIENT)
Age: 64
End: 2024-05-06

## 2024-05-22 ENCOUNTER — RX RENEWAL (OUTPATIENT)
Age: 64
End: 2024-05-22

## 2024-05-22 RX ORDER — CHOLESTYRAMINE 4 G/9G
4 POWDER, FOR SUSPENSION ORAL 4 TIMES DAILY
Qty: 120 | Refills: 3 | Status: ACTIVE | COMMUNITY
Start: 2023-02-15 | End: 1900-01-01

## 2024-05-22 RX ORDER — DICLOFENAC SODIUM 75 MG/1
75 TABLET, DELAYED RELEASE ORAL
Qty: 30 | Refills: 2 | Status: ACTIVE | COMMUNITY
Start: 2023-03-17 | End: 1900-01-01

## 2024-06-03 ENCOUNTER — NON-APPOINTMENT (OUTPATIENT)
Age: 64
End: 2024-06-03

## 2024-07-01 ENCOUNTER — APPOINTMENT (OUTPATIENT)
Dept: FAMILY MEDICINE | Facility: CLINIC | Age: 64
End: 2024-07-01
Payer: MEDICAID

## 2024-07-01 VITALS — RESPIRATION RATE: 20 BRPM | SYSTOLIC BLOOD PRESSURE: 125 MMHG | DIASTOLIC BLOOD PRESSURE: 70 MMHG | HEART RATE: 76 BPM

## 2024-07-01 DIAGNOSIS — M79.2 NEURALGIA AND NEURITIS, UNSPECIFIED: ICD-10-CM

## 2024-07-01 DIAGNOSIS — I10 ESSENTIAL (PRIMARY) HYPERTENSION: ICD-10-CM

## 2024-07-01 DIAGNOSIS — E78.5 HYPERLIPIDEMIA, UNSPECIFIED: ICD-10-CM

## 2024-07-01 PROCEDURE — 99214 OFFICE O/P EST MOD 30 MIN: CPT

## 2024-07-01 PROCEDURE — 36415 COLL VENOUS BLD VENIPUNCTURE: CPT

## 2024-07-02 DIAGNOSIS — E66.9 OBESITY, UNSPECIFIED: ICD-10-CM

## 2024-07-02 LAB
ALBUMIN SERPL ELPH-MCNC: 4.6 G/DL
ALP BLD-CCNC: 116 U/L
ALT SERPL-CCNC: 6 U/L
ANION GAP SERPL CALC-SCNC: 16 MMOL/L
AST SERPL-CCNC: 10 U/L
BILIRUB SERPL-MCNC: 0.8 MG/DL
BUN SERPL-MCNC: 27 MG/DL
CALCIUM SERPL-MCNC: 9.6 MG/DL
CHLORIDE SERPL-SCNC: 104 MMOL/L
CHOLEST SERPL-MCNC: 255 MG/DL
CO2 SERPL-SCNC: 21 MMOL/L
CREAT SERPL-MCNC: 0.88 MG/DL
EGFR: 75 ML/MIN/1.73M2
ESTIMATED AVERAGE GLUCOSE: 114 MG/DL
GLUCOSE SERPL-MCNC: 101 MG/DL
HBA1C MFR BLD HPLC: 5.6 %
HCT VFR BLD CALC: 41.9 %
HDLC SERPL-MCNC: 51 MG/DL
HGB BLD-MCNC: 13.2 G/DL
LDLC SERPL CALC-MCNC: 162 MG/DL
MCHC RBC-ENTMCNC: 26.9 PG
MCHC RBC-ENTMCNC: 31.5 GM/DL
MCV RBC AUTO: 85.5 FL
NONHDLC SERPL-MCNC: 204 MG/DL
PLATELET # BLD AUTO: 255 K/UL
POTASSIUM SERPL-SCNC: 4.4 MMOL/L
PROT SERPL-MCNC: 7.8 G/DL
RBC # BLD: 4.9 M/UL
RBC # FLD: 15.9 %
SODIUM SERPL-SCNC: 141 MMOL/L
TRIGL SERPL-MCNC: 225 MG/DL
WBC # FLD AUTO: 7.85 K/UL

## 2024-07-02 RX ORDER — SEMAGLUTIDE 0.25 MG/.5ML
0.25 INJECTION, SOLUTION SUBCUTANEOUS
Qty: 1 | Refills: 3 | Status: ACTIVE | COMMUNITY
Start: 2024-07-02 | End: 1900-01-01

## 2024-07-03 ENCOUNTER — NON-APPOINTMENT (OUTPATIENT)
Age: 64
End: 2024-07-03

## 2024-07-03 ENCOUNTER — RX RENEWAL (OUTPATIENT)
Age: 64
End: 2024-07-03

## 2024-07-10 ENCOUNTER — APPOINTMENT (OUTPATIENT)
Dept: ORTHOPEDIC SURGERY | Facility: CLINIC | Age: 64
End: 2024-07-10

## 2024-08-02 ENCOUNTER — NON-APPOINTMENT (OUTPATIENT)
Age: 64
End: 2024-08-02

## 2024-08-02 ENCOUNTER — APPOINTMENT (OUTPATIENT)
Dept: FAMILY MEDICINE | Facility: CLINIC | Age: 64
End: 2024-08-02

## 2024-08-12 NOTE — PHYSICAL THERAPY INITIAL EVALUATION ADULT - TRANSFER SAFETY CONCERNS NOTED: SIT/STAND, REHAB EVAL
losing balance
Treatment Goal Explanation (Does Not Render In The Note): Stable for the purposes of categorizing medical decision making is defined by the specific treatment goals for an individual patient. A patient that is not at their treatment goal is not stable, even if the condition has not changed and there is no short- term threat to life or function.

## 2024-08-15 RX ORDER — GABAPENTIN 300 MG/1
300 CAPSULE ORAL
Qty: 30 | Refills: 3 | Status: ACTIVE | COMMUNITY
Start: 2024-08-15 | End: 1900-01-01

## 2024-09-13 ENCOUNTER — RX RENEWAL (OUTPATIENT)
Age: 64
End: 2024-09-13

## 2024-09-15 ENCOUNTER — NON-APPOINTMENT (OUTPATIENT)
Age: 64
End: 2024-09-15

## 2024-10-01 ENCOUNTER — APPOINTMENT (OUTPATIENT)
Dept: FAMILY MEDICINE | Facility: CLINIC | Age: 64
End: 2024-10-01

## 2024-10-15 ENCOUNTER — NON-APPOINTMENT (OUTPATIENT)
Age: 64
End: 2024-10-15

## 2024-10-17 RX ORDER — MULTIVIT-MINS NO.7/FOLIC ACID 1 MG
CAPSULE ORAL
Qty: 90 | Refills: 3 | Status: DISCONTINUED | COMMUNITY
Start: 2024-10-15 | End: 2024-10-17

## 2024-10-17 RX ORDER — MULTIVIT-MIN/IRON FUM/FOLIC AC 7.5 MG-4
TABLET ORAL DAILY
Qty: 90 | Refills: 3 | Status: ACTIVE | COMMUNITY
Start: 2024-10-17 | End: 1900-01-01

## 2024-11-06 ENCOUNTER — APPOINTMENT (OUTPATIENT)
Dept: ORTHOPEDIC SURGERY | Facility: CLINIC | Age: 64
End: 2024-11-06
Payer: MEDICAID

## 2024-11-06 DIAGNOSIS — M16.51 UNILATERAL POST-TRAUMATIC OSTEOARTHRITIS, RIGHT HIP: ICD-10-CM

## 2024-11-06 DIAGNOSIS — M17.11 UNILATERAL PRIMARY OSTEOARTHRITIS, RIGHT KNEE: ICD-10-CM

## 2024-11-06 DIAGNOSIS — M17.12 UNILATERAL PRIMARY OSTEOARTHRITIS, LEFT KNEE: ICD-10-CM

## 2024-11-06 PROCEDURE — 99214 OFFICE O/P EST MOD 30 MIN: CPT | Mod: 25

## 2024-11-06 PROCEDURE — 20610 DRAIN/INJ JOINT/BURSA W/O US: CPT | Mod: 50

## 2024-11-06 PROCEDURE — J3490M: CUSTOM

## 2024-11-07 ENCOUNTER — OFFICE (OUTPATIENT)
Dept: URBAN - METROPOLITAN AREA CLINIC 102 | Facility: CLINIC | Age: 64
Setting detail: OPHTHALMOLOGY
End: 2024-11-07
Payer: COMMERCIAL

## 2024-11-07 DIAGNOSIS — H25.13: ICD-10-CM

## 2024-11-07 DIAGNOSIS — H43.392: ICD-10-CM

## 2024-11-07 DIAGNOSIS — H52.4: ICD-10-CM

## 2024-11-07 DIAGNOSIS — H43.811: ICD-10-CM

## 2024-11-07 DIAGNOSIS — H53.19: ICD-10-CM

## 2024-11-07 PROBLEM — H52.7 REFRACTIVE ERROR: Status: ACTIVE | Noted: 2024-11-07

## 2024-11-07 PROCEDURE — 92015 DETERMINE REFRACTIVE STATE: CPT | Performed by: OPHTHALMOLOGY

## 2024-11-07 PROCEDURE — 92020 GONIOSCOPY: CPT | Performed by: OPHTHALMOLOGY

## 2024-11-07 PROCEDURE — 92083 EXTENDED VISUAL FIELD XM: CPT | Performed by: OPHTHALMOLOGY

## 2024-11-07 PROCEDURE — 92004 COMPRE OPH EXAM NEW PT 1/>: CPT | Performed by: OPHTHALMOLOGY

## 2024-11-07 ASSESSMENT — KERATOMETRY
OD_K1POWER_DIOPTERS: 42.50
METHOD_AUTO_MANUAL: AUTO
OS_K2POWER_DIOPTERS: 43.25
OS_AXISANGLE_DEGREES: 080
OD_AXISANGLE_DEGREES: 083
OD_K2POWER_DIOPTERS: 43.25
OS_K1POWER_DIOPTERS: 42.75

## 2024-11-07 ASSESSMENT — REFRACTION_AUTOREFRACTION
OS_SPHERE: 0.00
OD_CYLINDER: -0.25
OS_AXIS: 156
OD_AXIS: 179
OS_CYLINDER: -0.75
OD_SPHERE: -0.50

## 2024-11-07 ASSESSMENT — REFRACTION_MANIFEST
OS_VA1: 20/40+2
OD_VA1: 20/25
OD_CYLINDER: SPHERE
OS_ADD: +2.25
OD_SPHERE: -0.50
OS_SPHERE: -0.50
OS_CYLINDER: -0.75
OD_ADD: +2.25
OS_AXIS: 155

## 2024-11-07 ASSESSMENT — CONFRONTATIONAL VISUAL FIELD TEST (CVF)
OS_FINDINGS: FULL
OD_FINDINGS: FULL

## 2024-11-07 ASSESSMENT — VISUAL ACUITY
OD_BCVA: 20/50-2
OS_BCVA: 20/50-1

## 2024-11-07 ASSESSMENT — TONOMETRY
OD_IOP_MMHG: 16
OD_IOP_MMHG: 14
OS_IOP_MMHG: 16

## 2024-11-08 ENCOUNTER — TRANSCRIPTION ENCOUNTER (OUTPATIENT)
Age: 64
End: 2024-11-08

## 2024-11-19 NOTE — ED ADULT TRIAGE NOTE - SPO2 (%)
"Physical Therapy                 Therapy Communication Note    Patient Name: Florina Doll \"Dionne\"  MRN: [id number]  Department: [place] [address]  Room: [address] [place] [address]  Today's Date: 11/19/2024     Discipline: Physical Therapy    Comment: PT referral received, chart reviewed; per OT (Claudine), pt is independent for mobility; Will discontinue PT eval orders at this time secondary to at baseline LOF.  " 100